# Patient Record
Sex: FEMALE | Race: OTHER | HISPANIC OR LATINO | Employment: PART TIME | URBAN - METROPOLITAN AREA
[De-identification: names, ages, dates, MRNs, and addresses within clinical notes are randomized per-mention and may not be internally consistent; named-entity substitution may affect disease eponyms.]

---

## 2017-10-30 ENCOUNTER — HOSPITAL ENCOUNTER (EMERGENCY)
Facility: HOSPITAL | Age: 22
Discharge: HOME | End: 2017-10-30
Attending: EMERGENCY MEDICINE | Admitting: EMERGENCY MEDICINE
Payer: COMMERCIAL

## 2017-10-30 VITALS
TEMPERATURE: 97.6 F | HEIGHT: 63 IN | BODY MASS INDEX: 19.49 KG/M2 | WEIGHT: 110 LBS | OXYGEN SATURATION: 100 % | HEART RATE: 73 BPM | DIASTOLIC BLOOD PRESSURE: 66 MMHG | SYSTOLIC BLOOD PRESSURE: 112 MMHG | RESPIRATION RATE: 18 BRPM

## 2017-10-30 DIAGNOSIS — S30.861A TICK BITE OF ABDOMEN, INITIAL ENCOUNTER: Primary | ICD-10-CM
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
## 2017-10-30 PROCEDURE — 99282 EMERGENCY DEPT VISIT SF MDM: CPT

## 2017-10-30 RX ORDER — DOXYCYCLINE HYCLATE 100 MG/1
100 CAPSULE ORAL EVERY 12 HOURS SCHEDULED
Qty: 28 CAPSULE | Refills: 0 | Status: SHIPPED | OUTPATIENT
Start: 2017-10-30 | End: 2017-11-13

## 2017-10-30 RX ORDER — DOXYCYCLINE HYCLATE 100 MG/1
100 CAPSULE ORAL ONCE
Status: COMPLETED | OUTPATIENT
Start: 2017-10-30 | End: 2017-10-30

## 2017-10-30 RX ADMIN — DOXYCYCLINE HYCLATE 100 MG: 100 CAPSULE, GELATIN COATED ORAL at 19:07

## 2017-10-30 NOTE — DISCHARGE INSTRUCTIONS
Tick Bite   WHAT YOU NEED TO KNOW:   Most tick bites are not dangerous, but ticks can pass disease or infection when they bite  Ticks need to be removed quickly  You may have redness, pain, itching, and swelling near the bite  Blisters may also develop  DISCHARGE INSTRUCTIONS:   Return to the emergency department if:   · You have trouble walking or moving your legs  · You have joint pain, muscle pain, or muscle weakness within 1 month of a tick bite  · You have a fever, chills, headache, or rash  Contact your healthcare provider if:   · You cannot remove the tick  · The tick's head is stuck in your skin  · You have questions or concerns about your condition or care  Medicines:   · Medicines  help decrease pain, redness, itching, and swelling  You may also need medicine to prevent or fight a bacterial infection  These medicines may be given as a cream, lotion, or pill  · Take your medicine as directed  Contact your healthcare provider if you think your medicine is not helping or if you have side effects  Tell him of her if you are allergic to any medicine  Keep a list of the medicines, vitamins, and herbs you take  Include the amounts, and when and why you take them  Bring the list or the pill bottles to follow-up visits  Carry your medicine list with you in case of an emergency  How to remove a tick:  Remove the tick as soon as possible to help prevent disease or infection  You are less likely to get sick from a tick bite if you remove the tick within 24 hours  Do not use petroleum jelly, nail polish, rubbing alcohol, or heat  These do not work and may be dangerous  Do the following to remove a tick:  · First, try a soapy cotton ball  Soak a cotton ball in liquid soap  Cover the tick with the cotton ball for 30 seconds  The tick may come off with the cotton ball when you pull it away  · Use tweezers if the soapy cotton ball does not work  Grasp the tick as close to your skin as possible  Pull the tick straight up and out  Do not touch the tick with your bare hands  · Do not twist or jerk the tick suddenly, because this may break off the tick's head or mouth parts  Do not leave any part of the tick in your skin  · Do not crush or squeeze the tick since its body may be infected with germs  Flush the tick down the toilet  · After the tick is removed, clean the area of the bite with rubbing alcohol  Then wash your hands with soap and water  Apply ice  on your bite for 15 to 20 minutes every hour or as directed  Use an ice pack, or put crushed ice in a plastic bag  Cover it with a towel before you apply it to your skin  Ice helps prevent tissue damage and decreases swelling and pain  Prevent a tick bite:  Ticks live in areas covered by brush and grass  They may even be found in your lawn if you live in certain areas  Outdoor pets can carry ticks inside the house  Ticks can grab onto you or your clothes when you walk by grass or brush  If you go into areas that contain many trees, tall grasses, and underbrush, do the following:  · Wear light colored pants and a long-sleeved shirt  Tuck your pants into your socks or boots  Tuck in your shirt  Wear sleeves that fit close to the skin at your wrists and neck  This will help prevent ticks from crawling through gaps in your clothing and onto your skin  Wear a hat in areas with trees  · Apply insect repellant on your skin  The insect repellant should contain DEET  Do not put insect repellant on skin that is cut, scratched, or irritated  Always use soap and water to wash the insect repellant off as soon as possible once you are indoors  Do not apply insect repellant on your child's face or hands  · Spray insect repellant onto your clothes  Use permethrin spray  This spray kills ticks that crawl on your clothing  Be sure to spray the tops of your boots, bottom of pant legs, and sleeve cuffs   As soon as possible, wash and dry clothing in hot water and high heat  · Check your clothing, hair, and skin for ticks  Shower within 2 hours of coming indoors  Carefully check the hairline, armpits, neck, and waist  Check your pets and children for ticks  Remove ticks from pets the same way as you remove them from people  · Decrease the risk for ticks in your yard  Ticks like to live in shady, moist areas  Marquise Morales your lawn regularly to keep the grass short  Trim the grass around birdbaths and fences  Cut branches that are overgrown and take them out of the yard  Clear out leaf piles  Caralyn Salter firewood in a dry, jv area  Follow up with your healthcare provider as directed:  Write down your questions so you remember to ask them during your visits  © 2017 [employer] [employer] Information is for End User's use only and may not be sold, redistributed or otherwise used for commercial purposes  All illustrations and images included in CareNotes® are the copyrighted property of [employer]  or Osvaldo Ford  The above information is an  only  It is not intended as medical advice for individual conditions or treatments  Talk to your doctor, nurse or pharmacist before following any medical regimen to see if it is safe and effective for you

## 2017-10-31 NOTE — ED PROVIDER NOTES
History  Chief Complaint   Patient presents with    Tick Bite     Patient reports tick bite on stomach  unsure of when she received it  small amount of reddness     24 y/o female presents today after discovering a tick on her abdomen earlier today  Tick was removed by her friend  Unknown how long the tick was on there  Unknown whether the tick was engorged or not  Mild redness at the site  No other rashes or symptoms         History provided by:  Patient  Tick Bite   Contact animal:  Insect  Time since incident: unknown  Pain details:     Severity:  No pain  Incident location:  Unable to specify  Tetanus status:  Up to date  Relieved by:  None tried  Worsened by:  Nothing  Ineffective treatments:  None tried  Associated symptoms: rash    Associated symptoms: no fever, no numbness and no swelling        None       History reviewed  No pertinent past medical history  History reviewed  No pertinent surgical history  History reviewed  No pertinent family history  I have reviewed and agree with the history as documented  Social History   Substance Use Topics    Smoking status: Never Smoker    Smokeless tobacco: Never Used    Alcohol use No        Review of Systems   Constitutional: Negative for appetite change, chills, fatigue, fever and unexpected weight change  HENT: Negative for drooling, postnasal drip, sore throat and trouble swallowing  Eyes: Negative for visual disturbance  Respiratory: Negative for chest tightness and shortness of breath  Skin: Positive for rash  Allergic/Immunologic: Negative for immunocompromised state  Neurological: Negative for dizziness, light-headedness, numbness and headaches  Psychiatric/Behavioral: Negative for behavioral problems         Physical Exam  ED Triage Vitals [10/30/17 1812]   Temperature Pulse Respirations Blood Pressure SpO2   97 6 °F (36 4 °C) 73 18 112/66 100 %      Temp Source Heart Rate Source Patient Position - Orthostatic VS BP Location FiO2 (%)   Temporal Monitor Sitting Right arm --      Pain Score       No Pain           Orthostatic Vital Signs  Vitals:    10/30/17 1812   BP: 112/66   Pulse: 73   Patient Position - Orthostatic VS: Sitting       Physical Exam   Constitutional: She appears well-developed and well-nourished  HENT:   Head: Normocephalic and atraumatic  Eyes: Pupils are equal, round, and reactive to light  Neck: Normal range of motion  Abdominal: Soft  Bowel sounds are normal        Small (<1cm) area of erythema on left lateral abdomen  No tick present   Skin: Skin is warm and dry  Nursing note and vitals reviewed  ED Medications  Medications   doxycycline hyclate (VIBRAMYCIN) capsule 100 mg (100 mg Oral Given 10/30/17 1907)       Diagnostic Studies  Results Reviewed     None                 No orders to display              Procedures  Procedures       Phone Contacts  ED Phone Contact    ED Course  ED Course                                MDM  Number of Diagnoses or Management Options  Tick bite of abdomen, initial encounter: new and does not require workup  Diagnosis management comments: 9:19 PM  Late entry - due to unknown length of time of tick being present, will opt to treat with doxycycline  F/u with PCP as outpatient  Risk of Complications, Morbidity, and/or Mortality  Presenting problems: low  Management options: low    Patient Progress  Patient progress: stable    CritCare Time    Disposition  Final diagnoses:   Tick bite of abdomen, initial encounter     Time reflects when diagnosis was documented in both MDM as applicable and the Disposition within this note     Time User Action Codes Description Comment    10/30/2017  7:02 PM Shannon Valenzuela Add [[id number],  Kristin Stringer  XXXA] Tick bite of abdomen, initial encounter       ED Disposition     ED Disposition Condition Comment    Discharge  Daniella Erwin discharge to home/self care      Condition at discharge: Stable        Follow-up Information     Follow up With Specialties Details Why Contact Info    your PCP  Schedule an appointment as soon as possible for a visit          Discharge Medication List as of 10/30/2017  7:02 PM      START taking these medications    Details   doxycycline hyclate (VIBRAMYCIN) 100 mg capsule Take 1 capsule by mouth every 12 (twelve) hours for 14 days, Starting Mon 10/30/2017, Until Mon 11/13/2017, Print           No discharge procedures on file      ED Provider  Electronically Signed by           Arun Akbar DO  10/30/17 0310

## 2018-04-27 ENCOUNTER — APPOINTMENT (OUTPATIENT)
Dept: LAB | Facility: HOSPITAL | Age: 23
End: 2018-04-27
Payer: COMMERCIAL

## 2018-04-27 ENCOUNTER — TRANSCRIBE ORDERS (OUTPATIENT)
Dept: ADMINISTRATIVE | Facility: HOSPITAL | Age: 23
End: 2018-04-27

## 2018-04-27 DIAGNOSIS — R06.2 WHEEZING: ICD-10-CM

## 2018-04-27 DIAGNOSIS — Z20.2 VENEREAL DISEASE CONTACT: ICD-10-CM

## 2018-04-27 DIAGNOSIS — N91.2 ABSENCE OF MENSTRUATION: Primary | ICD-10-CM

## 2018-04-27 DIAGNOSIS — N91.2 ABSENCE OF MENSTRUATION: ICD-10-CM

## 2018-04-27 LAB
ALBUMIN SERPL BCP-MCNC: 4.2 G/DL (ref 3.5–5)
ALP SERPL-CCNC: 54 U/L (ref 46–116)
ALT SERPL W P-5'-P-CCNC: 20 U/L (ref 12–78)
ANION GAP SERPL CALCULATED.3IONS-SCNC: 9 MMOL/L (ref 4–13)
AST SERPL W P-5'-P-CCNC: 16 U/L (ref 5–45)
BASOPHILS # BLD AUTO: 0.02 THOUSANDS/ΜL (ref 0–0.1)
BASOPHILS NFR BLD AUTO: 0 % (ref 0–1)
BILIRUB SERPL-MCNC: 0.9 MG/DL (ref 0.2–1)
BUN SERPL-MCNC: 13 MG/DL (ref 5–25)
CALCIUM SERPL-MCNC: 9 MG/DL (ref 8.3–10.1)
CHLORIDE SERPL-SCNC: 104 MMOL/L (ref 100–108)
CHOLEST SERPL-MCNC: 143 MG/DL (ref 50–200)
CO2 SERPL-SCNC: 26 MMOL/L (ref 21–32)
CREAT SERPL-MCNC: 0.97 MG/DL (ref 0.6–1.3)
EOSINOPHIL # BLD AUTO: 0.03 THOUSAND/ΜL (ref 0–0.61)
EOSINOPHIL NFR BLD AUTO: 0 % (ref 0–6)
ERYTHROCYTE [DISTWIDTH] IN BLOOD BY AUTOMATED COUNT: 14.6 % (ref 11.6–15.1)
GFR SERPL CREATININE-BSD FRML MDRD: 83 ML/MIN/1.73SQ M
GLUCOSE P FAST SERPL-MCNC: 78 MG/DL (ref 65–99)
HCT VFR BLD AUTO: 39.1 % (ref 34.8–46.1)
HDLC SERPL-MCNC: 78 MG/DL (ref 40–60)
HGB BLD-MCNC: 12.6 G/DL (ref 11.5–15.4)
LDLC SERPL CALC-MCNC: 56 MG/DL (ref 0–100)
LYMPHOCYTES # BLD AUTO: 1.97 THOUSANDS/ΜL (ref 0.6–4.47)
LYMPHOCYTES NFR BLD AUTO: 26 % (ref 14–44)
MCH RBC QN AUTO: 29.3 PG (ref 26.8–34.3)
MCHC RBC AUTO-ENTMCNC: 32.2 G/DL (ref 31.4–37.4)
MCV RBC AUTO: 91 FL (ref 82–98)
MONOCYTES # BLD AUTO: 0.55 THOUSAND/ΜL (ref 0.17–1.22)
MONOCYTES NFR BLD AUTO: 7 % (ref 4–12)
NEUTROPHILS # BLD AUTO: 5.03 THOUSANDS/ΜL (ref 1.85–7.62)
NEUTS SEG NFR BLD AUTO: 66 % (ref 43–75)
NONHDLC SERPL-MCNC: 65 MG/DL
NRBC BLD AUTO-RTO: 0 /100 WBCS
PLATELET # BLD AUTO: 273 THOUSANDS/UL (ref 149–390)
PMV BLD AUTO: 10.1 FL (ref 8.9–12.7)
POTASSIUM SERPL-SCNC: 3.8 MMOL/L (ref 3.5–5.3)
PROT SERPL-MCNC: 7.5 G/DL (ref 6.4–8.2)
RBC # BLD AUTO: 4.3 MILLION/UL (ref 3.81–5.12)
SODIUM SERPL-SCNC: 139 MMOL/L (ref 136–145)
TRIGL SERPL-MCNC: 45 MG/DL
TSH SERPL DL<=0.05 MIU/L-ACNC: 0.6 UIU/ML (ref 0.36–3.74)
WBC # BLD AUTO: 7.63 THOUSAND/UL (ref 4.31–10.16)

## 2018-04-27 PROCEDURE — 87491 CHLMYD TRACH DNA AMP PROBE: CPT

## 2018-04-27 PROCEDURE — 82785 ASSAY OF IGE: CPT

## 2018-04-27 PROCEDURE — 36415 COLL VENOUS BLD VENIPUNCTURE: CPT

## 2018-04-27 PROCEDURE — 80074 ACUTE HEPATITIS PANEL: CPT

## 2018-04-27 PROCEDURE — 86003 ALLG SPEC IGE CRUDE XTRC EA: CPT

## 2018-04-27 PROCEDURE — 86008 ALLG SPEC IGE RECOMB EA: CPT

## 2018-04-27 PROCEDURE — 86800 THYROGLOBULIN ANTIBODY: CPT

## 2018-04-27 PROCEDURE — 80061 LIPID PANEL: CPT

## 2018-04-27 PROCEDURE — 86592 SYPHILIS TEST NON-TREP QUAL: CPT

## 2018-04-27 PROCEDURE — 80053 COMPREHEN METABOLIC PANEL: CPT

## 2018-04-27 PROCEDURE — 85025 COMPLETE CBC W/AUTO DIFF WBC: CPT

## 2018-04-27 PROCEDURE — 87389 HIV-1 AG W/HIV-1&-2 AB AG IA: CPT

## 2018-04-27 PROCEDURE — 87591 N.GONORRHOEAE DNA AMP PROB: CPT

## 2018-04-27 PROCEDURE — 86696 HERPES SIMPLEX TYPE 2 TEST: CPT

## 2018-04-27 PROCEDURE — 84443 ASSAY THYROID STIM HORMONE: CPT

## 2018-04-27 PROCEDURE — 86695 HERPES SIMPLEX TYPE 1 TEST: CPT

## 2018-04-28 LAB
HAV IGM SER QL: NORMAL
HBV CORE IGM SER QL: NORMAL
HBV SURFACE AG SER QL: NORMAL
HCV AB SER QL: NORMAL
THYROGLOB AB SERPL-ACNC: <1 IU/ML (ref 0–0.9)

## 2018-04-30 ENCOUNTER — HOSPITAL ENCOUNTER (EMERGENCY)
Facility: HOSPITAL | Age: 23
Discharge: HOME | End: 2018-04-30
Attending: EMERGENCY MEDICINE | Admitting: EMERGENCY MEDICINE
Payer: COMMERCIAL

## 2018-04-30 VITALS
TEMPERATURE: 98.2 F | OXYGEN SATURATION: 100 % | DIASTOLIC BLOOD PRESSURE: 62 MMHG | WEIGHT: 106 LBS | SYSTOLIC BLOOD PRESSURE: 108 MMHG | BODY MASS INDEX: 18.78 KG/M2 | HEIGHT: 63 IN | HEART RATE: 77 BPM | RESPIRATION RATE: 18 BRPM

## 2018-04-30 DIAGNOSIS — N73.0 PID (ACUTE PELVIC INFLAMMATORY DISEASE): Primary | ICD-10-CM

## 2018-04-30 DIAGNOSIS — T74.21XA SEXUAL ASSAULT OF ADULT: ICD-10-CM

## 2018-04-30 LAB
A ALTERNATA IGE QN: <0.1 KUA/I
A FUMIGATUS IGE QN: <0.1 KUA/I
A-LACTALB IGE QN: <0.1 KAU/I
ALBUMIN SERPL BCP-MCNC: 3.6 G/DL (ref 3.5–5)
ALLERGEN COMMENT: ABNORMAL
ALLERGEN COMMENT: ABNORMAL
ALMOND IGE QN: <0.1 KUA/I
ALP SERPL-CCNC: 49 U/L (ref 46–116)
ALT SERPL W P-5'-P-CCNC: 16 U/L (ref 12–78)
AMPHETAMINES SERPL QL SCN: NEGATIVE
ANION GAP SERPL CALCULATED.3IONS-SCNC: 6 MMOL/L (ref 4–13)
AST SERPL W P-5'-P-CCNC: 11 U/L (ref 5–45)
B-LACTOGLOB IGE QN: <0.1 KAU/I
BARBITURATES UR QL: NEGATIVE
BASOPHILS # BLD AUTO: 0.02 THOUSANDS/ΜL (ref 0–0.1)
BASOPHILS NFR BLD AUTO: 0 % (ref 0–1)
BENZODIAZ UR QL: NEGATIVE
BERMUDA GRASS IGE QN: <0.1 KUA/I
BILIRUB SERPL-MCNC: 0.3 MG/DL (ref 0.2–1)
BILIRUB UR QL STRIP: NEGATIVE
BOXELDER IGE QN: <0.1 KUA/I
BUN SERPL-MCNC: 8 MG/DL (ref 5–25)
C HERBARUM IGE QN: <0.1 KUA/I
CALCIUM SERPL-MCNC: 8.4 MG/DL (ref 8.3–10.1)
CASEIN IGE QN: <0.1 KAU/I
CASHEW NUT IGE QN: <0.1 KUA/I
CAT DANDER IGE QN: 2.63 KUA/I
CHLAMYDIA DNA CVX QL NAA+PROBE: NORMAL
CHLORIDE SERPL-SCNC: 105 MMOL/L (ref 100–108)
CLARITY UR: NORMAL
CMN PIGWEED IGE QN: <0.1 KUA/I
CO2 SERPL-SCNC: 30 MMOL/L (ref 21–32)
COCAINE UR QL: NEGATIVE
CODFISH IGE QN: <0.1 KUA/I
COLOR UR: YELLOW
COMMON RAGWEED IGE QN: <0.1 KUA/I
COTTONWOOD IGE QN: <0.1 KUA/I
CREAT SERPL-MCNC: 0.98 MG/DL (ref 0.6–1.3)
D FARINAE IGE QN: 1.34 KUA/I
D PTERONYSS IGE QN: 1.53 KUA/I
DOG DANDER IGE QN: 2.07 KUA/I
EGG WHITE IGE QN: <0.1 KUA/I
EOSINOPHIL # BLD AUTO: 0.11 THOUSAND/ΜL (ref 0–0.61)
EOSINOPHIL NFR BLD AUTO: 1 % (ref 0–6)
ERYTHROCYTE [DISTWIDTH] IN BLOOD BY AUTOMATED COUNT: 14.5 % (ref 11.6–15.1)
EXT PREG TEST URINE: NEGATIVE
GFR SERPL CREATININE-BSD FRML MDRD: 82 ML/MIN/1.73SQ M
GLUCOSE SERPL-MCNC: 87 MG/DL (ref 65–140)
GLUCOSE UR STRIP-MCNC: NEGATIVE MG/DL
GLUTEN IGE QN: <0.1 KUA/I
HAZELNUT IGE QN: <0.1 KUA/L
HCT VFR BLD AUTO: 36.5 % (ref 34.8–46.1)
HGB BLD-MCNC: 11.7 G/DL (ref 11.5–15.4)
HGB UR QL STRIP.AUTO: NEGATIVE
HIV 1+2 AB+HIV1 P24 AG SERPL QL IA: NORMAL
KETONES UR STRIP-MCNC: NEGATIVE MG/DL
LEUKOCYTE ESTERASE UR QL STRIP: NEGATIVE
LIPASE SERPL-CCNC: 194 U/L (ref 73–393)
LONDON PLANE IGE QN: <0.1 KUA/I
LYMPHOCYTES # BLD AUTO: 3.02 THOUSANDS/ΜL (ref 0.6–4.47)
LYMPHOCYTES NFR BLD AUTO: 33 % (ref 14–44)
MCH RBC QN AUTO: 29.1 PG (ref 26.8–34.3)
MCHC RBC AUTO-ENTMCNC: 32.1 G/DL (ref 31.4–37.4)
MCV RBC AUTO: 91 FL (ref 82–98)
METHADONE UR QL: NEGATIVE
MILK IGE QN: 0.11 KUA/I
MONOCYTES # BLD AUTO: 0.69 THOUSAND/ΜL (ref 0.17–1.22)
MONOCYTES NFR BLD AUTO: 8 % (ref 4–12)
MOUSE URINE PROT IGE QN: <0.1 KUA/I
MT JUNIPER IGE QN: 0.21 KUA/I
MUGWORT IGE QN: <0.1 KUA/I
N GONORRHOEA DNA GENITAL QL NAA+PROBE: NORMAL
NEUTROPHILS # BLD AUTO: 5.23 THOUSANDS/ΜL (ref 1.85–7.62)
NEUTS SEG NFR BLD AUTO: 58 % (ref 43–75)
NITRITE UR QL STRIP: NEGATIVE
NRBC BLD AUTO-RTO: 0 /100 WBCS
OPIATES UR QL SCN: NEGATIVE
P NOTATUM IGE QN: <0.1 KUA/I
PCP UR QL: NEGATIVE
PEANUT IGE QN: <0.1 KUA/I
PH UR STRIP.AUTO: 8 [PH] (ref 4.5–8)
PLATELET # BLD AUTO: 252 THOUSANDS/UL (ref 149–390)
PMV BLD AUTO: 9.6 FL (ref 8.9–12.7)
POTASSIUM SERPL-SCNC: 3.8 MMOL/L (ref 3.5–5.3)
PROT SERPL-MCNC: 6.4 G/DL (ref 6.4–8.2)
PROT UR STRIP-MCNC: NEGATIVE MG/DL
RBC # BLD AUTO: 4.02 MILLION/UL (ref 3.81–5.12)
ROACH IGE QN: 11.2 KUA/I
RPR SER QL: NORMAL
SALMON IGE QN: <0.1 KUA/I
SCALLOP IGE QN: <0.1 KUA/L
SESAME SEED IGE QN: <0.1 KUA/I
SHEEP SORREL IGE QN: <0.1 KUA/I
SHRIMP IGE QN: 1.3 KUA/L
SILVER BIRCH IGE QN: <0.1 KUA/I
SODIUM SERPL-SCNC: 141 MMOL/L (ref 136–145)
SOYBEAN IGE QN: <0.1 KUA/I
SP GR UR STRIP.AUTO: 1.02 (ref 1–1.03)
THC UR QL: POSITIVE
TIMOTHY IGE QN: <0.1 KUA/I
TOTAL IGE SMQN RAST: 497 KU/L (ref 0–113)
TOTAL IGE SMQN RAST: 514 KU/L (ref 0–113)
TUNA IGE QN: <0.1 KUA/I
UROBILINOGEN UR QL STRIP.AUTO: 0.2 E.U./DL
WALNUT IGE QN: <0.1 KUA/I
WALNUT IGE QN: <0.1 KUA/I
WBC # BLD AUTO: 9.1 THOUSAND/UL (ref 4.31–10.16)
WHEAT IGE QN: <0.1 KUA/I
WHITE ASH IGE QN: <0.1 KUA/I
WHITE ELM IGE QN: <0.1 KUA/I
WHITE MULBERRY IGE QN: <0.1 KUA/I
WHITE OAK IGE QN: <0.1 KUA/I

## 2018-04-30 PROCEDURE — 80053 COMPREHEN METABOLIC PANEL: CPT | Performed by: EMERGENCY MEDICINE

## 2018-04-30 PROCEDURE — 83690 ASSAY OF LIPASE: CPT | Performed by: EMERGENCY MEDICINE

## 2018-04-30 PROCEDURE — 96374 THER/PROPH/DIAG INJ IV PUSH: CPT

## 2018-04-30 PROCEDURE — 80307 DRUG TEST PRSMV CHEM ANLYZR: CPT | Performed by: EMERGENCY MEDICINE

## 2018-04-30 PROCEDURE — 36415 COLL VENOUS BLD VENIPUNCTURE: CPT | Performed by: EMERGENCY MEDICINE

## 2018-04-30 PROCEDURE — 87591 N.GONORRHOEAE DNA AMP PROB: CPT | Performed by: EMERGENCY MEDICINE

## 2018-04-30 PROCEDURE — 81025 URINE PREGNANCY TEST: CPT | Performed by: EMERGENCY MEDICINE

## 2018-04-30 PROCEDURE — 96375 TX/PRO/DX INJ NEW DRUG ADDON: CPT

## 2018-04-30 PROCEDURE — 99285 EMERGENCY DEPT VISIT HI MDM: CPT

## 2018-04-30 PROCEDURE — 85025 COMPLETE CBC W/AUTO DIFF WBC: CPT | Performed by: EMERGENCY MEDICINE

## 2018-04-30 PROCEDURE — 96372 THER/PROPH/DIAG INJ SC/IM: CPT

## 2018-04-30 PROCEDURE — 87491 CHLMYD TRACH DNA AMP PROBE: CPT | Performed by: EMERGENCY MEDICINE

## 2018-04-30 PROCEDURE — 96361 HYDRATE IV INFUSION ADD-ON: CPT

## 2018-04-30 PROCEDURE — 81003 URINALYSIS AUTO W/O SCOPE: CPT | Performed by: EMERGENCY MEDICINE

## 2018-04-30 RX ORDER — METRONIDAZOLE 500 MG/1
500 TABLET ORAL EVERY 12 HOURS SCHEDULED
Qty: 28 TABLET | Refills: 0 | Status: SHIPPED | OUTPATIENT
Start: 2018-04-30 | End: 2018-05-14 | Stop reason: HOSPADM

## 2018-04-30 RX ORDER — METRONIDAZOLE 500 MG/1
500 TABLET ORAL ONCE
Status: COMPLETED | OUTPATIENT
Start: 2018-04-30 | End: 2018-04-30

## 2018-04-30 RX ORDER — KETOROLAC TROMETHAMINE 30 MG/ML
15 INJECTION, SOLUTION INTRAMUSCULAR; INTRAVENOUS ONCE
Status: COMPLETED | OUTPATIENT
Start: 2018-04-30 | End: 2018-04-30

## 2018-04-30 RX ORDER — ONDANSETRON 2 MG/ML
4 INJECTION INTRAMUSCULAR; INTRAVENOUS ONCE
Status: COMPLETED | OUTPATIENT
Start: 2018-04-30 | End: 2018-04-30

## 2018-04-30 RX ORDER — FLUCONAZOLE 200 MG/1
200 TABLET ORAL DAILY
Qty: 2 TABLET | Refills: 0 | Status: SHIPPED | OUTPATIENT
Start: 2018-04-30 | End: 2018-05-02

## 2018-04-30 RX ORDER — AZITHROMYCIN 250 MG/1
1000 TABLET, FILM COATED ORAL ONCE
Status: COMPLETED | OUTPATIENT
Start: 2018-04-30 | End: 2018-04-30

## 2018-04-30 RX ADMIN — KETOROLAC TROMETHAMINE 15 MG: 30 INJECTION, SOLUTION INTRAMUSCULAR at 18:50

## 2018-04-30 RX ADMIN — METRONIDAZOLE 500 MG: 500 TABLET ORAL at 21:23

## 2018-04-30 RX ADMIN — ONDANSETRON 4 MG: 2 INJECTION INTRAMUSCULAR; INTRAVENOUS at 21:26

## 2018-04-30 RX ADMIN — SODIUM CHLORIDE 1000 ML: 0.9 INJECTION, SOLUTION INTRAVENOUS at 18:47

## 2018-04-30 RX ADMIN — LIDOCAINE HYDROCHLORIDE 250 MG: 10 INJECTION, SOLUTION EPIDURAL; INFILTRATION; INTRACAUDAL; PERINEURAL at 21:27

## 2018-04-30 RX ADMIN — AZITHROMYCIN 1000 MG: 250 TABLET, FILM COATED ORAL at 21:24
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
## 2018-05-02 ENCOUNTER — TELEPHONE (OUTPATIENT)
Dept: OBGYN CLINIC | Facility: CLINIC | Age: 23
End: 2018-05-02

## 2018-05-02 LAB
CHLAMYDIA DNA CVX QL NAA+PROBE: NORMAL
N GONORRHOEA DNA GENITAL QL NAA+PROBE: NORMAL

## 2018-05-02 NOTE — PROGRESS NOTES
Please call alfredo - would she like an office visit for follow up to ER? Treated for pelvic infection    Or at least an annual exam for gyn care

## 2018-05-02 NOTE — TELEPHONE ENCOUNTER
----- Message from Maris Fernando MD sent at 5/2/2018 12:27 PM EDT -----      ----- Message -----  From: Prem Huang DO  Sent: 5/1/2018  12:01 AM  To: Maris Fernando MD

## 2018-05-03 LAB — MISCELLANEOUS LAB TEST RESULT: NORMAL

## 2018-05-12 ENCOUNTER — HOSPITAL ENCOUNTER (INPATIENT)
Facility: HOSPITAL | Age: 23
LOS: 2 days | DRG: 812 | End: 2018-05-14
Attending: EMERGENCY MEDICINE | Admitting: ANESTHESIOLOGY
Payer: COMMERCIAL

## 2018-05-12 DIAGNOSIS — T39.1X2A INTENTIONAL ACETAMINOPHEN OVERDOSE (HCC): ICD-10-CM

## 2018-05-12 DIAGNOSIS — T14.91XA SUICIDAL BEHAVIOR WITH ATTEMPTED SELF-INJURY (HCC): ICD-10-CM

## 2018-05-12 DIAGNOSIS — T39.1X4A ACETAMINOPHEN OVERDOSE OF UNDETERMINED INTENT, INITIAL ENCOUNTER: Primary | ICD-10-CM

## 2018-05-12 PROBLEM — G93.41 METABOLIC ENCEPHALOPATHY: Status: ACTIVE | Noted: 2018-05-12

## 2018-05-12 PROBLEM — B37.3 VAGINAL CANDIDIASIS: Status: ACTIVE | Noted: 2018-05-12

## 2018-05-12 PROBLEM — R06.89 RESPIRATORY DEPRESSION: Status: ACTIVE | Noted: 2018-05-12

## 2018-05-12 PROBLEM — I95.9 HYPOTENSION: Status: ACTIVE | Noted: 2018-05-12

## 2018-05-12 PROBLEM — T39.1X1A TYLENOL TOXICITY: Status: ACTIVE | Noted: 2018-05-12

## 2018-05-12 LAB
ALBUMIN SERPL BCP-MCNC: 3.4 G/DL (ref 3.5–5)
ALBUMIN SERPL BCP-MCNC: 4.1 G/DL (ref 3.5–5)
ALP SERPL-CCNC: 37 U/L (ref 46–116)
ALP SERPL-CCNC: 47 U/L (ref 46–116)
ALT SERPL W P-5'-P-CCNC: 37 U/L (ref 12–78)
ALT SERPL W P-5'-P-CCNC: 42 U/L (ref 12–78)
AMPHETAMINES SERPL QL SCN: NEGATIVE
ANION GAP SERPL CALCULATED.3IONS-SCNC: 10 MMOL/L (ref 4–13)
APAP SERPL-MCNC: 173.1 UG/ML (ref 10–30)
APAP SERPL-MCNC: 270.5 UG/ML (ref 10–30)
APTT PPP: 29 SECONDS (ref 23–35)
AST SERPL W P-5'-P-CCNC: 28 U/L (ref 5–45)
AST SERPL W P-5'-P-CCNC: 35 U/L (ref 5–45)
BARBITURATES UR QL: NEGATIVE
BASOPHILS # BLD AUTO: 0.03 THOUSANDS/ΜL (ref 0–0.1)
BASOPHILS NFR BLD AUTO: 1 % (ref 0–1)
BENZODIAZ UR QL: NEGATIVE
BILIRUB DIRECT SERPL-MCNC: 0.08 MG/DL (ref 0–0.2)
BILIRUB SERPL-MCNC: 0.4 MG/DL (ref 0.2–1)
BILIRUB SERPL-MCNC: 0.4 MG/DL (ref 0.2–1)
BUN SERPL-MCNC: 11 MG/DL (ref 5–25)
CALCIUM SERPL-MCNC: 9 MG/DL (ref 8.3–10.1)
CHLORIDE SERPL-SCNC: 104 MMOL/L (ref 100–108)
CO2 SERPL-SCNC: 25 MMOL/L (ref 21–32)
COCAINE UR QL: NEGATIVE
CREAT SERPL-MCNC: 0.91 MG/DL (ref 0.6–1.3)
EOSINOPHIL # BLD AUTO: 0.02 THOUSAND/ΜL (ref 0–0.61)
EOSINOPHIL NFR BLD AUTO: 0 % (ref 0–6)
ERYTHROCYTE [DISTWIDTH] IN BLOOD BY AUTOMATED COUNT: 14.3 % (ref 11.6–15.1)
ETHANOL SERPL-MCNC: <3 MG/DL (ref 0–3)
GFR SERPL CREATININE-BSD FRML MDRD: 89 ML/MIN/1.73SQ M
GLUCOSE SERPL-MCNC: 118 MG/DL (ref 65–140)
GLUCOSE SERPL-MCNC: 183 MG/DL (ref 65–140)
HCG SERPL QL: NEGATIVE
HCT VFR BLD AUTO: 38.1 % (ref 34.8–46.1)
HGB BLD-MCNC: 12.3 G/DL (ref 11.5–15.4)
INR PPP: 1.06 (ref 0.86–1.16)
LYMPHOCYTES # BLD AUTO: 2.04 THOUSANDS/ΜL (ref 0.6–4.47)
LYMPHOCYTES NFR BLD AUTO: 33 % (ref 14–44)
MAGNESIUM SERPL-MCNC: 1.7 MG/DL (ref 1.6–2.6)
MCH RBC QN AUTO: 29 PG (ref 26.8–34.3)
MCHC RBC AUTO-ENTMCNC: 32.3 G/DL (ref 31.4–37.4)
MCV RBC AUTO: 90 FL (ref 82–98)
METHADONE UR QL: NEGATIVE
MONOCYTES # BLD AUTO: 0.57 THOUSAND/ΜL (ref 0.17–1.22)
MONOCYTES NFR BLD AUTO: 9 % (ref 4–12)
NEUTROPHILS # BLD AUTO: 3.46 THOUSANDS/ΜL (ref 1.85–7.62)
NEUTS SEG NFR BLD AUTO: 56 % (ref 43–75)
NRBC BLD AUTO-RTO: 0 /100 WBCS
OPIATES UR QL SCN: NEGATIVE
PCP UR QL: NEGATIVE
PLATELET # BLD AUTO: 236 THOUSANDS/UL (ref 149–390)
PMV BLD AUTO: 9.9 FL (ref 8.9–12.7)
POTASSIUM SERPL-SCNC: 3.6 MMOL/L (ref 3.5–5.3)
PROT SERPL-MCNC: 6.2 G/DL (ref 6.4–8.2)
PROT SERPL-MCNC: 6.8 G/DL (ref 6.4–8.2)
PROTHROMBIN TIME: 14 SECONDS (ref 12.1–14.4)
RBC # BLD AUTO: 4.24 MILLION/UL (ref 3.81–5.12)
SALICYLATES SERPL-MCNC: <3 MG/DL (ref 3–20)
SODIUM SERPL-SCNC: 139 MMOL/L (ref 136–145)
THC UR QL: NEGATIVE
WBC # BLD AUTO: 6.14 THOUSAND/UL (ref 4.31–10.16)

## 2018-05-12 PROCEDURE — 36415 COLL VENOUS BLD VENIPUNCTURE: CPT | Performed by: EMERGENCY MEDICINE

## 2018-05-12 PROCEDURE — 80307 DRUG TEST PRSMV CHEM ANLYZR: CPT | Performed by: INTERNAL MEDICINE

## 2018-05-12 PROCEDURE — 85730 THROMBOPLASTIN TIME PARTIAL: CPT | Performed by: EMERGENCY MEDICINE

## 2018-05-12 PROCEDURE — 80320 DRUG SCREEN QUANTALCOHOLS: CPT | Performed by: EMERGENCY MEDICINE

## 2018-05-12 PROCEDURE — 99285 EMERGENCY DEPT VISIT HI MDM: CPT

## 2018-05-12 PROCEDURE — 82948 REAGENT STRIP/BLOOD GLUCOSE: CPT

## 2018-05-12 PROCEDURE — 81001 URINALYSIS AUTO W/SCOPE: CPT | Performed by: NURSE PRACTITIONER

## 2018-05-12 PROCEDURE — 80329 ANALGESICS NON-OPIOID 1 OR 2: CPT | Performed by: EMERGENCY MEDICINE

## 2018-05-12 PROCEDURE — 80307 DRUG TEST PRSMV CHEM ANLYZR: CPT | Performed by: EMERGENCY MEDICINE

## 2018-05-12 PROCEDURE — 80329 ANALGESICS NON-OPIOID 1 OR 2: CPT | Performed by: NURSE PRACTITIONER

## 2018-05-12 PROCEDURE — 80053 COMPREHEN METABOLIC PANEL: CPT | Performed by: EMERGENCY MEDICINE

## 2018-05-12 PROCEDURE — 80361 OPIATES 1 OR MORE: CPT | Performed by: NURSE PRACTITIONER

## 2018-05-12 PROCEDURE — 80076 HEPATIC FUNCTION PANEL: CPT | Performed by: NURSE PRACTITIONER

## 2018-05-12 PROCEDURE — 84703 CHORIONIC GONADOTROPIN ASSAY: CPT | Performed by: EMERGENCY MEDICINE

## 2018-05-12 PROCEDURE — 85610 PROTHROMBIN TIME: CPT | Performed by: EMERGENCY MEDICINE

## 2018-05-12 PROCEDURE — 96374 THER/PROPH/DIAG INJ IV PUSH: CPT

## 2018-05-12 PROCEDURE — 93005 ELECTROCARDIOGRAM TRACING: CPT

## 2018-05-12 PROCEDURE — 83735 ASSAY OF MAGNESIUM: CPT | Performed by: EMERGENCY MEDICINE

## 2018-05-12 PROCEDURE — 85025 COMPLETE CBC W/AUTO DIFF WBC: CPT | Performed by: EMERGENCY MEDICINE

## 2018-05-12 RX ORDER — SODIUM CHLORIDE 9 MG/ML
100 INJECTION, SOLUTION INTRAVENOUS CONTINUOUS
Status: DISCONTINUED | OUTPATIENT
Start: 2018-05-12 | End: 2018-05-13

## 2018-05-12 RX ORDER — HEPARIN SODIUM 5000 [USP'U]/ML
5000 INJECTION, SOLUTION INTRAVENOUS; SUBCUTANEOUS EVERY 8 HOURS SCHEDULED
Status: DISCONTINUED | OUTPATIENT
Start: 2018-05-12 | End: 2018-05-14 | Stop reason: HOSPADM

## 2018-05-12 RX ORDER — ONDANSETRON 2 MG/ML
4 INJECTION INTRAMUSCULAR; INTRAVENOUS ONCE
Status: COMPLETED | OUTPATIENT
Start: 2018-05-12 | End: 2018-05-12

## 2018-05-12 RX ORDER — NALOXONE HYDROCHLORIDE 0.4 MG/ML
0.4 INJECTION, SOLUTION INTRAMUSCULAR; INTRAVENOUS; SUBCUTANEOUS ONCE
Status: DISCONTINUED | OUTPATIENT
Start: 2018-05-12 | End: 2018-05-12

## 2018-05-12 RX ORDER — NALOXONE HYDROCHLORIDE 1 MG/ML
INJECTION INTRAMUSCULAR; INTRAVENOUS; SUBCUTANEOUS
Status: COMPLETED
Start: 2018-05-12 | End: 2018-05-12

## 2018-05-12 RX ORDER — FAMOTIDINE 20 MG/1
20 TABLET, FILM COATED ORAL 2 TIMES DAILY
Status: DISCONTINUED | OUTPATIENT
Start: 2018-05-12 | End: 2018-05-14 | Stop reason: HOSPADM

## 2018-05-12 RX ORDER — CHLORHEXIDINE GLUCONATE 0.12 MG/ML
15 RINSE ORAL EVERY 12 HOURS SCHEDULED
Status: DISCONTINUED | OUTPATIENT
Start: 2018-05-12 | End: 2018-05-13

## 2018-05-12 RX ORDER — CLOTRIMAZOLE 1 %
1 CREAM WITH APPLICATOR VAGINAL
Status: DISCONTINUED | OUTPATIENT
Start: 2018-05-12 | End: 2018-05-14 | Stop reason: HOSPADM

## 2018-05-12 RX ORDER — NALOXONE HYDROCHLORIDE 0.4 MG/ML
1 INJECTION, SOLUTION INTRAMUSCULAR; INTRAVENOUS; SUBCUTANEOUS ONCE
Status: COMPLETED | OUTPATIENT
Start: 2018-05-12 | End: 2018-05-12

## 2018-05-12 RX ORDER — NALOXONE HYDROCHLORIDE 1 MG/ML
1 INJECTION INTRAMUSCULAR; INTRAVENOUS; SUBCUTANEOUS
Status: DISCONTINUED | OUTPATIENT
Start: 2018-05-12 | End: 2018-05-14 | Stop reason: HOSPADM

## 2018-05-12 RX ADMIN — SODIUM CHLORIDE 1000 ML: 0.9 INJECTION, SOLUTION INTRAVENOUS at 20:17

## 2018-05-12 RX ADMIN — CHLORHEXIDINE GLUCONATE 15 ML: 1.2 RINSE ORAL at 22:08

## 2018-05-12 RX ADMIN — ACETYLCYSTEINE 7220 MG: 6 INJECTION, SOLUTION INTRAVENOUS at 19:16

## 2018-05-12 RX ADMIN — ONDANSETRON 4 MG: 2 INJECTION INTRAMUSCULAR; INTRAVENOUS at 19:49

## 2018-05-12 RX ADMIN — ACETYLCYSTEINE 2400 MG: 6 INJECTION, SOLUTION INTRAVENOUS at 21:05

## 2018-05-12 RX ADMIN — FAMOTIDINE 20 MG: 10 INJECTION, SOLUTION INTRAVENOUS at 22:08

## 2018-05-12 RX ADMIN — SODIUM CHLORIDE 1000 ML: 0.9 INJECTION, SOLUTION INTRAVENOUS at 23:43

## 2018-05-12 RX ADMIN — NALOXONE HYDROCHLORIDE 2 MG: 1 INJECTION PARENTERAL at 23:17

## 2018-05-12 RX ADMIN — HEPARIN SODIUM 5000 UNITS: 5000 INJECTION, SOLUTION INTRAVENOUS; SUBCUTANEOUS at 23:16

## 2018-05-12 RX ADMIN — NALOXONE HYDROCHLORIDE 1 MG: 0.4 INJECTION, SOLUTION INTRAMUSCULAR; INTRAVENOUS; SUBCUTANEOUS at 23:18

## 2018-05-12 RX ADMIN — CLOTRIMAZOLE 1 APPLICATOR: 10 CREAM VAGINAL at 23:19

## 2018-05-12 RX ADMIN — ONDANSETRON 4 MG: 2 INJECTION INTRAMUSCULAR; INTRAVENOUS at 21:05

## 2018-05-12 RX ADMIN — SODIUM CHLORIDE 100 ML/HR: 0.9 INJECTION, SOLUTION INTRAVENOUS at 22:10

## 2018-05-12 RX ADMIN — SODIUM CHLORIDE, PRESERVATIVE FREE 0.04 MG: 5 INJECTION INTRAVENOUS at 19:49

## 2018-05-12 NOTE — ED NOTES
Pt refused multiple times to provide urine sample stating "i'm tired"  Pt finally agreed to urine sample when I said I would have to straight cath her  Pt was assisted to bathroom by her friend  When I came out of another patients room, her friend was in the bathroom with her  They both came out together and the patient handed me her urine sample       Rocky Santos, CHIDI  05/12/18 [id number]

## 2018-05-12 NOTE — PROGRESS NOTES
I came to evaluate this patient  Patient does not know what else she took  She mentioned that she took lots of Tylenol  Patient is hypotensive and somnolent  I could hardly wake her up or keep her awake  She also then started vomiting  Her Tylenol level is also toxic  She has been started on see to date  There is also some history of psych issues and suicidal attempt  I have discussed about this case with critical care team   This patient is not appropriate for floor and should be monitored more closely at least in a step-down unit  I did ordered after discussion with critical care team Narcan  Also ordered a bolus of IV fluids for low blood pressure  Repeat urine tox screening as patient's friend went with the patient in the bathroom and not sure if this was really accurate  For nausea and vomiting give her a dose of Zofran  Also discussed with staff-RN Abdelrahman Unger taking care of the patient at the time of my visit that patient should not have any visitors as this was requested by critical care team as well

## 2018-05-12 NOTE — ED PROVIDER NOTES
History  Chief Complaint   Patient presents with    Overdose - Intentional     Pt reportedly took (19) 500mg tylenol approximately 5 hours PTA  26-year-old presents for evaluation after reportedly overdosing on Tylenol  Patient says that around noon today she took 19 500 milligram extra-strength tablets of Tylenol  Patient says that she did this because she that she fell down and injured her right wrist   She is accompanied with her friend who was concerned and brought her in  Patient has no other complaints  Denies having nausea, vomiting, chest pain or shortness of breath  She says that she was not trying to harm herself  Denies depression or suicidal ideation  Prior to Admission Medications   Prescriptions Last Dose Informant Patient Reported? Taking?   metroNIDAZOLE (FLAGYL) 500 mg tablet   No No   Sig: Take 1 tablet (500 mg total) by mouth every 12 (twelve) hours for 14 days      Facility-Administered Medications: None       History reviewed  No pertinent past medical history  History reviewed  No pertinent surgical history  History reviewed  No pertinent family history  I have reviewed and agree with the history as documented  Social History   Substance Use Topics    Smoking status: Never Smoker    Smokeless tobacco: Never Used    Alcohol use No      Comment: occasional        Review of Systems   Constitutional: Negative for chills and fever  HENT: Negative for congestion and sore throat  Eyes: Negative for pain and redness  Respiratory: Negative for shortness of breath and wheezing  Cardiovascular: Negative for chest pain and palpitations  Gastrointestinal: Negative for abdominal pain, diarrhea and vomiting  Endocrine: Negative for polydipsia and polyphagia  Genitourinary: Negative for dysuria and flank pain  Musculoskeletal: Negative for arthralgias and back pain  Skin: Negative for rash and wound  Neurological: Negative for seizures and headaches  Psychiatric/Behavioral: Negative for agitation and behavioral problems  All other systems reviewed and are negative  Physical Exam  ED Triage Vitals   Temperature Pulse Respirations Blood Pressure SpO2   05/12/18 1734 05/12/18 1732 05/12/18 1732 05/12/18 1732 05/12/18 1732   97 6 °F (36 4 °C) 75 16 116/73 100 %      Temp Source Heart Rate Source Patient Position - Orthostatic VS BP Location FiO2 (%)   05/12/18 1734 05/12/18 1732 05/12/18 1732 05/12/18 1732 --   Oral Monitor Lying Right arm       Pain Score       05/12/18 1732       No Pain           Orthostatic Vital Signs  Vitals:    05/12/18 1732 05/12/18 1745 05/12/18 1911   BP: 116/73 110/63 (!) 88/52   Pulse: 75 71 64   Patient Position - Orthostatic VS: Lying  Lying       Physical Exam   Constitutional: She is oriented to person, place, and time  She appears well-developed and well-nourished  HENT:   Head: Normocephalic and atraumatic  Right Ear: External ear normal    Left Ear: External ear normal    Mouth/Throat: Oropharynx is clear and moist    Eyes: EOM are normal  Pupils are equal, round, and reactive to light  Neck: Normal range of motion  Cardiovascular: Normal rate, regular rhythm and normal heart sounds  Exam reveals no friction rub  No murmur heard  Pulmonary/Chest: Effort normal  No respiratory distress  She has no wheezes  Abdominal: Soft  Bowel sounds are normal  She exhibits no distension  There is no tenderness  There is no rebound and no guarding  Musculoskeletal: Normal range of motion  She exhibits no edema  Neurological: She is alert and oriented to person, place, and time  No cranial nerve deficit  Coordination normal    Skin: Skin is warm  No erythema  Psychiatric:   Flat affect, quiet   Nursing note and vitals reviewed        ED Medications  Medications   acetylcysteine (ACETADOTE) 7,220 mg in dextrose 5 % 200 mL IVPB (7,220 mg Intravenous New Bag 5/12/18 1916)   acetylcysteine (ACETADOTE) 2,400 mg in dextrose 5 % 500 mL IVPB (not administered)   acetylcysteine (ACETADOTE) 4,820 mg in dextrose 5 % 1,000 mL IVPB (not administered)       Diagnostic Studies  Results Reviewed     Procedure Component Value Units Date/Time    Acetaminophen level [[id number]]  (Abnormal) Collected:  05/12/18 1755    Lab Status:  Final result Specimen:  Blood from Arm, Right Updated:  05/12/18 1840     Acetaminophen Level 270 5 (HH) ug/mL     hCG, qualitative pregnancy [[id number]]  (Normal) Collected:  05/12/18 1755    Lab Status:  Final result Specimen:  Blood from Arm, Right Updated:  05/12/18 1837     Preg, Serum Negative    Salicylate level [[id number]]  (Abnormal) Collected:  05/12/18 1755    Lab Status:  Final result Specimen:  Blood from Arm, Right Updated:  51/27/98 0540     Salicylate Lvl <3 (L) mg/dL     Rapid drug screen, urine [[id number]]  (Normal) Collected:  05/12/18 1814    Lab Status:  Final result Specimen:  Urine from Urine, Clean Catch Updated:  05/12/18 1833     Amph/Meth UR Negative     Barbiturate Ur Negative     Benzodiazepine Urine Negative     Cocaine Urine Negative     Methadone Urine Negative     Opiate Urine Negative     PCP Ur Negative     THC Urine Negative    Narrative:         FOR MEDICAL PURPOSES ONLY  IF CONFIRMATION NEEDED PLEASE CONTACT THE LAB WITHIN 5 DAYS      Drug Screen Cutoff Levels:  AMPHETAMINE/METHAMPHETAMINES  1000 ng/mL  BARBITURATES     200 ng/mL  BENZODIAZEPINES     200 ng/mL  COCAINE      300 ng/mL  METHADONE      300 ng/mL  OPIATES      300 ng/mL  PHENCYCLIDINE     25 ng/mL  THC       50 ng/mL    Comprehensive metabolic panel [[id number]] Collected:  05/12/18 1755    Lab Status:  Final result Specimen:  Blood from Arm, Right Updated:  05/12/18 1824     Sodium 139 mmol/L      Potassium 3 6 mmol/L      Chloride 104 mmol/L      CO2 25 mmol/L      Anion Gap 10 mmol/L      BUN 11 mg/dL      Creatinine 0 91 mg/dL      Glucose 118 mg/dL      Calcium 9 0 mg/dL      AST 35 U/L      ALT 42 U/L      Alkaline Phosphatase 47 U/L      Total Protein 6 8 g/dL      Albumin 4 1 g/dL      Total Bilirubin 0 40 mg/dL      eGFR 89 ml/min/1 73sq m     Narrative:         National Kidney Disease Education Program recommendations are as follows:  GFR calculation is accurate only with a steady state creatinine  Chronic Kidney disease less than 60 ml/min/1 73 sq  meters  Kidney failure less than 15 ml/min/1 73 sq  meters      Ethanol [[id number]]  (Normal) Collected:  05/12/18 1755    Lab Status:  Final result Specimen:  Blood from Arm, Right Updated:  05/12/18 1819     Ethanol Lvl <3 mg/dL     Magnesium [[id number]]  (Normal) Collected:  05/12/18 1755    Lab Status:  Final result Specimen:  Blood from Arm, Right Updated:  05/12/18 1818     Magnesium 1 7 mg/dL     Protime-INR [[id number]]  (Normal) Collected:  05/12/18 1755    Lab Status:  Final result Specimen:  Blood from Arm, Right Updated:  05/12/18 1817     Protime 14 0 seconds      INR 1 06    APTT [[id number]]  (Normal) Collected:  05/12/18 1755    Lab Status:  Final result Specimen:  Blood from Arm, Right Updated:  05/12/18 1817     PTT 29 seconds     CBC and differential [[id number]] Collected:  05/12/18 1755    Lab Status:  Final result Specimen:  Blood from Arm, Right Updated:  05/12/18 1807     WBC 6 14 Thousand/uL      RBC 4 24 Million/uL      Hemoglobin 12 3 g/dL      Hematocrit 38 1 %      MCV 90 fL      MCH 29 0 pg      MCHC 32 3 g/dL      RDW 14 3 %      MPV 9 9 fL      Platelets 877 Thousands/uL      nRBC 0 /100 WBCs      Neutrophils Relative 56 %      Lymphocytes Relative 33 %      Monocytes Relative 9 %      Eosinophils Relative 0 %      Basophils Relative 1 %      Neutrophils Absolute 3 46 Thousands/µL      Lymphocytes Absolute 2 04 Thousands/µL      Monocytes Absolute 0 57 Thousand/µL      Eosinophils Absolute 0 02 Thousand/µL      Basophils Absolute 0 03 Thousands/µL                  No orders to display              Procedures  Procedures       Phone Contacts  ED Phone Contact    ED Course                               MDM  Number of Diagnoses or Management Options  Diagnosis management comments: Impression:  Overdose on Tylenol the  Suspect that overdose was intentional the although the patient is denying at this time  She has a flat affect, is not making eye contact, and is providing very little history  And Plan:  Toxic overdose evaluation, if nontoxic ingestion, will consult crisis      CritCare Time    Disposition  Final diagnoses:   Acetaminophen overdose of undetermined intent, initial encounter     Time reflects when diagnosis was documented in both MDM as applicable and the Disposition within this note     Time User Action Codes Description Comment    5/12/2018  7:00 PM Helen Miller Add [[id number]] Acetaminophen overdose of undetermined intent, initial encounter       ED Disposition     ED Disposition Condition Comment    Admit  Case was discussed with MARLA and the patient's admission status was agreed to be Admission Status: observation status to the service of Dr Roberto Guzman Ink    None       Patient's Medications   Discharge Prescriptions    No medications on file     No discharge procedures on file      ED Provider  Electronically Signed by           Nathen Guillen MD  05/12/18 9970

## 2018-05-12 NOTE — LETTER
[address] [address]  Dept: [phone]    May 14, 2018     Patient: Maday Fuller   YOB: 1995   Date of Visit: 5/12/2018       To Whom it May Concern:    Maday Fuller is under my professional care  She was seen in the hospital from 5/12/2018   to 05/14/18  She Was discharged on 05/14 to a 2nd facility, may return to work as judged by 2nd facility       If you have any questions or concerns, please don't hesitate to call [phone]         Sincerely,          Ralf Case PA-C

## 2018-05-13 LAB
ALBUMIN SERPL BCP-MCNC: 3.2 G/DL (ref 3.5–5)
ALP SERPL-CCNC: 39 U/L (ref 46–116)
ALT SERPL W P-5'-P-CCNC: 39 U/L (ref 12–78)
AMMONIA PLAS-SCNC: <10 UMOL/L (ref 11–35)
AMPHETAMINES SERPL QL SCN: NEGATIVE
AMPHETAMINES SERPL QL SCN: NEGATIVE
ANION GAP SERPL CALCULATED.3IONS-SCNC: 11 MMOL/L (ref 4–13)
APAP SERPL-MCNC: 61.9 UG/ML (ref 10–30)
AST SERPL W P-5'-P-CCNC: 25 U/L (ref 5–45)
ATRIAL RATE: 81 BPM
BACTERIA UR QL AUTO: ABNORMAL /HPF
BARBITURATES UR QL: NEGATIVE
BARBITURATES UR QL: NEGATIVE
BASOPHILS # BLD AUTO: 0.02 THOUSANDS/ΜL (ref 0–0.1)
BASOPHILS NFR BLD AUTO: 0 % (ref 0–1)
BENZODIAZ UR QL: NEGATIVE
BENZODIAZ UR QL: NEGATIVE
BILIRUB SERPL-MCNC: 0.5 MG/DL (ref 0.2–1)
BILIRUB UR QL STRIP: NEGATIVE
BUN SERPL-MCNC: 6 MG/DL (ref 5–25)
CA-I BLD-SCNC: 1.09 MMOL/L (ref 1.12–1.32)
CALCIUM SERPL-MCNC: 8.1 MG/DL (ref 8.3–10.1)
CHLORIDE SERPL-SCNC: 107 MMOL/L (ref 100–108)
CLARITY UR: CLEAR
CO2 SERPL-SCNC: 20 MMOL/L (ref 21–32)
COCAINE UR QL: NEGATIVE
COCAINE UR QL: NEGATIVE
COLOR UR: YELLOW
CREAT SERPL-MCNC: 0.85 MG/DL (ref 0.6–1.3)
EOSINOPHIL # BLD AUTO: 0 THOUSAND/ΜL (ref 0–0.61)
EOSINOPHIL NFR BLD AUTO: 0 % (ref 0–6)
ERYTHROCYTE [DISTWIDTH] IN BLOOD BY AUTOMATED COUNT: 14.1 % (ref 11.6–15.1)
GFR SERPL CREATININE-BSD FRML MDRD: 97 ML/MIN/1.73SQ M
GLUCOSE SERPL-MCNC: 104 MG/DL (ref 65–140)
GLUCOSE SERPL-MCNC: 125 MG/DL (ref 65–140)
GLUCOSE UR STRIP-MCNC: NEGATIVE MG/DL
HCT VFR BLD AUTO: 38.6 % (ref 34.8–46.1)
HGB BLD-MCNC: 12.5 G/DL (ref 11.5–15.4)
HGB UR QL STRIP.AUTO: ABNORMAL
INR PPP: 1.28 (ref 0.86–1.16)
KETONES UR STRIP-MCNC: ABNORMAL MG/DL
LEUKOCYTE ESTERASE UR QL STRIP: NEGATIVE
LYMPHOCYTES # BLD AUTO: 1.98 THOUSANDS/ΜL (ref 0.6–4.47)
LYMPHOCYTES NFR BLD AUTO: 33 % (ref 14–44)
MAGNESIUM SERPL-MCNC: 1.8 MG/DL (ref 1.6–2.6)
MCH RBC QN AUTO: 28.8 PG (ref 26.8–34.3)
MCHC RBC AUTO-ENTMCNC: 32.4 G/DL (ref 31.4–37.4)
MCV RBC AUTO: 89 FL (ref 82–98)
METHADONE UR QL: NEGATIVE
METHADONE UR QL: NEGATIVE
MONOCYTES # BLD AUTO: 0.34 THOUSAND/ΜL (ref 0.17–1.22)
MONOCYTES NFR BLD AUTO: 6 % (ref 4–12)
NEUTROPHILS # BLD AUTO: 3.62 THOUSANDS/ΜL (ref 1.85–7.62)
NEUTS SEG NFR BLD AUTO: 61 % (ref 43–75)
NITRITE UR QL STRIP: NEGATIVE
NON-SQ EPI CELLS URNS QL MICRO: ABNORMAL /HPF
NRBC BLD AUTO-RTO: 0 /100 WBCS
OPIATES UR QL SCN: NEGATIVE
OPIATES UR QL SCN: NEGATIVE
P AXIS: 74 DEGREES
PCP UR QL: NEGATIVE
PCP UR QL: NEGATIVE
PH UR STRIP.AUTO: 6 [PH] (ref 4.5–8)
PHOSPHATE SERPL-MCNC: 3.8 MG/DL (ref 2.7–4.5)
PLATELET # BLD AUTO: 231 THOUSANDS/UL (ref 149–390)
PMV BLD AUTO: 9.6 FL (ref 8.9–12.7)
POTASSIUM SERPL-SCNC: 3.9 MMOL/L (ref 3.5–5.3)
PR INTERVAL: 152 MS
PROT SERPL-MCNC: 6.1 G/DL (ref 6.4–8.2)
PROT UR STRIP-MCNC: NEGATIVE MG/DL
PROTHROMBIN TIME: 16.3 SECONDS (ref 12.1–14.4)
QRS AXIS: 81 DEGREES
QRSD INTERVAL: 86 MS
QT INTERVAL: 398 MS
QTC INTERVAL: 462 MS
RBC # BLD AUTO: 4.34 MILLION/UL (ref 3.81–5.12)
RBC #/AREA URNS AUTO: ABNORMAL /HPF
SODIUM SERPL-SCNC: 138 MMOL/L (ref 136–145)
SP GR UR STRIP.AUTO: 1.02 (ref 1–1.03)
T WAVE AXIS: 45 DEGREES
THC UR QL: NEGATIVE
THC UR QL: NEGATIVE
UROBILINOGEN UR QL STRIP.AUTO: 0.2 E.U./DL
VENTRICULAR RATE: 81 BPM
WBC # BLD AUTO: 5.97 THOUSAND/UL (ref 4.31–10.16)
WBC #/AREA URNS AUTO: ABNORMAL /HPF

## 2018-05-13 PROCEDURE — 80053 COMPREHEN METABOLIC PANEL: CPT | Performed by: NURSE PRACTITIONER

## 2018-05-13 PROCEDURE — 80329 ANALGESICS NON-OPIOID 1 OR 2: CPT | Performed by: NURSE PRACTITIONER

## 2018-05-13 PROCEDURE — 84100 ASSAY OF PHOSPHORUS: CPT | Performed by: NURSE PRACTITIONER

## 2018-05-13 PROCEDURE — 83735 ASSAY OF MAGNESIUM: CPT | Performed by: NURSE PRACTITIONER

## 2018-05-13 PROCEDURE — 85610 PROTHROMBIN TIME: CPT | Performed by: NURSE PRACTITIONER

## 2018-05-13 PROCEDURE — 82948 REAGENT STRIP/BLOOD GLUCOSE: CPT

## 2018-05-13 PROCEDURE — 80307 DRUG TEST PRSMV CHEM ANLYZR: CPT | Performed by: NURSE PRACTITIONER

## 2018-05-13 PROCEDURE — 85025 COMPLETE CBC W/AUTO DIFF WBC: CPT | Performed by: NURSE PRACTITIONER

## 2018-05-13 PROCEDURE — 82140 ASSAY OF AMMONIA: CPT | Performed by: NURSE PRACTITIONER

## 2018-05-13 PROCEDURE — 93010 ELECTROCARDIOGRAM REPORT: CPT | Performed by: INTERNAL MEDICINE

## 2018-05-13 PROCEDURE — 82330 ASSAY OF CALCIUM: CPT | Performed by: NURSE PRACTITIONER

## 2018-05-13 RX ADMIN — FAMOTIDINE 20 MG: 20 TABLET ORAL at 18:43

## 2018-05-13 RX ADMIN — FAMOTIDINE 20 MG: 20 TABLET ORAL at 09:31

## 2018-05-13 RX ADMIN — HEPARIN SODIUM 5000 UNITS: 5000 INJECTION, SOLUTION INTRAVENOUS; SUBCUTANEOUS at 06:58

## 2018-05-13 RX ADMIN — CHLORHEXIDINE GLUCONATE 15 ML: 1.2 RINSE ORAL at 09:29

## 2018-05-13 RX ADMIN — ACETYLCYSTEINE 4820 MG: 6 INJECTION, SOLUTION INTRAVENOUS at 00:50

## 2018-05-13 RX ADMIN — HEPARIN SODIUM 5000 UNITS: 5000 INJECTION, SOLUTION INTRAVENOUS; SUBCUTANEOUS at 15:52
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
## 2018-05-13 NOTE — PROGRESS NOTES
Patient has completed her course of N-acetylcysteine  Her LFTs were never elevated  Acetaminophen level downtrended,  Patient is medically clear for transfer to inpatient psychiatric facility

## 2018-05-13 NOTE — ED NOTES
Pt started acting more lethargic  Pt sat up and began vomiting brown bile contents on floor  MD present at bedside   Pt also noted to be hypotensive at this point     Kaila Garcia RN  05/12/18 2026

## 2018-05-13 NOTE — PROGRESS NOTES
Critical Care Medicine:  Significant Event  Carter Trevino  /  05/12/18    Called to see patient emergently due to low saturations in the 70s and altered mental status  Narcan 1 mg IV push given with positive result-fully awake and saturations of 98%  Patient again nauseous and with episode of vomiting  Advised patient that she may require additional doses of Narcan, dependent on her response  Continue to monitor closely  Visitors restricted upon admission to the step-down unit per the nursing supervisor  Security made aware      AURY Irvin

## 2018-05-13 NOTE — ED NOTES
Pt  Radha Singh in bed, relaxing and showing no signs of distress at this time  Pt  intermittently spitting into basin and stating, "I don't feel good" Side rails up and bed is lowest position for pt  Safety  Will continue to monitor       Nika Santiago  05/12/18 7453

## 2018-05-13 NOTE — ED NOTES
Pt  Throwing up brown bile and stating she can't breathe  O2 sat 97% on room air       Amee Thurston  05/12/18 1276

## 2018-05-13 NOTE — ED NOTES
Call placed to [facility], spoke with Sheila who stated she would check with the unit regarding bed availability and return call       SHALINI Arredondo  05/13/18   2600

## 2018-05-13 NOTE — ED NOTES
Pt  Neel Mccrary in bed, appears to be sleeping  Will continue to monitor       Rufina Form  05/12/18 7108
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
## 2018-05-13 NOTE — ED NOTES
Pt continuously vomiting bile at this point  Basin at bedside, fluids infuising, Pt yelling " pump my stomach!" Pt educated by myself and Ailyn DAVIDSON multiple times that we would not be pumping her stomach        Aylin Everett RN  05/12/18 2107       Aylin Everett RN  05/12/18 2110

## 2018-05-13 NOTE — ED NOTES
No beds available in-network ([facility], [facility], [facility], [facility]); bed search to following facilities:     [place]: No beds  [facility]: [facility] Beds   [place]: No beds available  [facility]: No beds available  [facility]: No beds available  [facility]: No Beds available  [place]: No beds available  [facility]: Spoke with [facility], no beds available  Bed search to continue tomorrow       SHALINI Nolasco  05/13/18   [id number]

## 2018-05-13 NOTE — PROGRESS NOTES
Progress Note - Critical Care   Jeanna Cardoso 21 y o  female MRN: [id number]  Unit/Bed#:  Encounter: [id number]    Attending Physician: Lisa Alvarez MD      ______________________________________________________________________  Assessment and Plan:   Principal Problem:    Intentional acetaminophen overdose [facility])  Active Problems:    Tylenol toxicity    Hypotension    Metabolic encephalopathy    Suicidal behavior with attempted self-injury ([facility] [address] )    Vaginal candidiasis    Respiratory depression  Resolved Problems:    * No resolved hospital problems  *        Neuro:  Serial neuro exams  Psychiatric evaluation pending  One-to-one observation and suicide precautions  Visitor restriction and affect due to patient's safety issues  CV:  Hemodynamically stable  Continue to monitor  Pulm:  Occasional periods of hypoxia associated with altered mental status  Continue supplemental oxygen to maintain saturations greater than 94%  Narcan as needed  GI:  H2 blocker for GI protection  Continue to monitor hepatic functions  Mucomyst to complete around noon time today  Tylenol level down trending  :  Lotrimin for vaginal candidiasis  No indication to continue Flagyl  F/E/N:  Monitor electrolytes and replete as needed  ID:  Lotrimin for vaginal candidiasis  Monitor fever and leukocyte curves  No immediate need for antibiotic treatment  Heme:  Hemoglobin and platelet count stable  Endo:  Continue to monitor glucoses on routine lab work  Msk/Skin:  Turn and reposition q 2 hours  Mobilize out of bed  Disposition:  Continue step-down level of care  Code Status: Level 1 - Full Code    Counseling / Coordination of Care  Total time spent today 32 minutes   Greater than 50% of total time was spent with the patient and / or family counseling and / or coordination of care    ______________________________________________________________________    24 Hour Events:   Patient may yesterday after admitting to suicidal ideation and attempt with overdose of 9 g of Tylenol  Course was complicated by altered mental status and hypoxia relieved with Narcan       ______________________________________________________________________    Physical Exam:   GEN:  Well nourished, well developed, appears stated age, no acute distress  HEENT:  Sclera anicteric, mucous membranes pink and moist, conjunctiva pink, no veena/rhinorrhea  Neck:  No lymphadenopathy, normal ROM, supple, no bruits  CV :  S1S2, no murmurs, rubs or gallops  Intact distal pulses  No JVD  Resp:  Lungs CTA =B/L, diminished throughout     No subq air or crepitus  Symmetrical expansion  No cough noted  GI :  Abd soft, nontender, no guarding/rebound, nondistended, normoactive bowel sounds X4 quads, no organomegaly appreciated  Neuro:  CN II-XII grossly intact, nonfocal exam, speech clear, GCS 15   Appears lethargic at times  Psych: Withdrawn affect, poor insight       ______________________________________________________________________  Blood pressure 103/63, pulse 70, temperature 98 2 °F (36 8 °C), temperature source Oral, resp  rate 16, weight 49 5 kg (109 lb 2 oz), SpO2 99 %  Temperature:   Temp (24hrs), Av 9 °F (36 6 °C), Min:97 6 °F (36 4 °C), Max:98 2 °F (36 8 °C)    Current Temperature: 98 2 °F (36 8 °C)  Weights:   IBW: -92 5 kg    Body mass index is 19 33 kg/m²    Weight (last 2 days)     Date/Time   Weight    18 2108  49 5 (109 13)              Non-Invasive/Invasive Ventilation Settings:  Respiratory    Lab Data (Last 4 hours)    None         O2/Vent Data (Last 4 hours)    None              No results found for: PHART, GNB2VUH, PO2ART, WGA4ZDZ, S5FDYMMT, BEART, SOURCE  SpO2: SpO2: 99 %  Intake and Outputs:  I/O        07 -  0700 701 -  0700    I V  (mL/kg)  183 3 (3 7)    IV Piggyback  2200    Total Intake(mL/kg)  2383 3 (48 1)    Urine (mL/kg/hr)  400    Total Output   400    Net +6861 4                Nutrition:        Diet Orders            Start     Ordered    05/12/18 2105  Diet NPO  Diet effective now     Question Answer Comment   Diet Type NPO    RD to adjust diet per protocol? No        05/12/18 2107          Labs:   Pending      Imaging:  None today  EKG:  Sinus rhythm on cardiac monitor  Micro:  No results found for: Kev Justice, WOUNDCULT, SPUTUMCULTUR  Allergies: No Known Allergies  Medications:   Scheduled Meds:  Current Facility-Administered Medications:  acetylcysteine 100 mg/kg Intravenous Once Harinder Worley MD Last Rate: 4,820 mg (05/13/18 0050)   chlorhexidine 15 mL Swish & Spit Q12H [address] [place], CRNP    clotrimazole 1 applicator Vaginal HS [place], CRNP    famotidine 20 mg Oral BID [place], CRNP    Or        famotidine 20 mg Intravenous BID [place], CRNP    heparin (porcine) 5,000 Units Subcutaneous [facility] [address] AURY Chambers    naloxone 1 mg Intravenous Q5 Min PRN [place], CRNP    sodium chloride 100 mL/hr Intravenous Continuous [place], CRNP Last Rate: 100 mL/hr (05/12/18 2210)     Continuous Infusions:  sodium chloride 100 mL/hr Last Rate: 100 mL/hr (05/12/18 2210)     PRN Meds:    naloxone 1 mg Q5 Min PRN     VTE Pharmacologic Prophylaxis: Heparin  VTE Mechanical Prophylaxis: sequential compression device  Invasive lines and devices: Invasive Devices     Peripheral Intravenous Line            Peripheral IV 05/12/18 Left Forearm less than 1 day    Peripheral IV 05/12/18 Right Antecubital less than 1 day                     Portions of the record may have been created with voice recognition software  Occasional wrong word or "sound a like" substitutions may have occurred due to the inherent limitations of voice recognition software    Read the chart carefully and recognize, using context, where substitutions have occurred      AURY Somers

## 2018-05-14 VITALS
DIASTOLIC BLOOD PRESSURE: 70 MMHG | HEIGHT: 63 IN | RESPIRATION RATE: 18 BRPM | WEIGHT: 109.13 LBS | BODY MASS INDEX: 19.34 KG/M2 | OXYGEN SATURATION: 98 % | TEMPERATURE: 97.7 F | SYSTOLIC BLOOD PRESSURE: 114 MMHG | HEART RATE: 64 BPM

## 2018-05-14 RX ORDER — IBUPROFEN 400 MG/1
400 TABLET ORAL EVERY 6 HOURS PRN
Status: DISCONTINUED | OUTPATIENT
Start: 2018-05-14 | End: 2018-05-14 | Stop reason: HOSPADM

## 2018-05-14 RX ADMIN — IBUPROFEN 400 MG: 400 TABLET, FILM COATED ORAL at 18:29

## 2018-05-14 RX ADMIN — FAMOTIDINE 20 MG: 20 TABLET ORAL at 18:29

## 2018-05-14 RX ADMIN — FAMOTIDINE 20 MG: 20 TABLET ORAL at 09:33
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
## 2018-05-14 NOTE — SOCIAL WORK
CM updated ICU AP  CM receive cb from [facility] to complete precert  Pt was authorized for 3 days, 5/14-5/16 Cook Pickup #[id number]  YENNI s/w Benigno Marin and provided Lesli Pickup # and transport time
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
## 2018-05-14 NOTE — SOCIAL WORK
LOS: 2 Per ICU AP pt is medically stable for dc to IP Psych  Complex CM s/w Sepideh Meredith who forwarded 302  CM lm for [facility] and [facility]  CM lm for [facility]  [facility] of [facility]  CM lm for [id number] [facility] s/w Jacque Vogel at [facility] who requested faxed clinicals, CM faxed  CM s/w Sheba Garcia who requested faxed clinicals- CM faxed  CM awaiting cb and determination

## 2018-05-14 NOTE — DISCHARGE SUMMARY
Discharge Summary   Braydon Richter 21 y o  female MRN: [id number]    [facility] ICU Room / Bed: / Encounter: [id number]      Admitting Provider: Ivan Mcdowell MD  Discharge Provider: Ivan Mcdowell MD  Admission Date: 5/12/2018     Discharge Date: No discharge date for patient encounter  Primary Care Physician at Discharge: Saira Reyes, [address]    Primary Discharge Diagnosis  Principal Problem:    Intentional acetaminophen overdose [facility])  Active Problems:    Tylenol toxicity    Hypotension    Metabolic encephalopathy    Suicidal behavior with attempted self-injury ([facility] [address] )    Vaginal candidiasis    Respiratory depression  Resolved Problems:    * No resolved hospital problems   *      Other Problems Addressed  Patient Active Problem List    Diagnosis Date Noted    Intentional acetaminophen overdose ([facility] [address] ) 05/12/2018    Tylenol toxicity 05/12/2018    Hypotension 77/28/2397    Metabolic encephalopathy 30/14/1778    Suicidal behavior with attempted self-injury ([facility] [address] ) 05/12/2018    Vaginal candidiasis 05/12/2018    Respiratory depression 05/12/2018       Consulting Providers   Psychiatry    Diagnostic Procedures Performed   On  Treatments   N acetylcysteine for Tylenol toxicity, Lotrimin for vaginal candidiasis  Procedures   None  Other Pertinent Test Results  Lab Results   Component Value Date    WBC 5 97 05/13/2018    HGB 12 5 05/13/2018    HCT 38 6 05/13/2018    MCV 89 05/13/2018     05/13/2018     Lab Results   Component Value Date    GLUCOSE 125 05/13/2018    CALCIUM 8 1 (L) 05/13/2018     05/13/2018    K 3 9 05/13/2018    CO2 20 (L) 05/13/2018     05/13/2018    BUN 6 05/13/2018    CREATININE 0 85 05/13/2018     Lab Results   Component Value Date    CALCIUM 8 1 (L) 05/13/2018    PHOS 3 8 05/13/2018     Lab Results   Component Value Date     05/13/2018    K 3 9 05/13/2018     05/13/2018    CO2 20 (L) 05/13/2018    ANIONGAP 11 05/13/2018    BUN 6 05/13/2018    CREATININE 0 85 05/13/2018    GLUCOSE 125 05/13/2018    GLUF 78 04/27/2018    CALCIUM 8 1 (L) 05/13/2018    AST 25 05/13/2018    ALT 39 05/13/2018    ALKPHOS 39 (L) 05/13/2018    PROT 6 1 (L) 05/13/2018    BILITOT 0 50 05/13/2018    EGFR 97 05/13/2018     Lab Results   Component Value Date    INR 1 28 (H) 05/13/2018    INR 1 06 05/12/2018    PROTIME 16 3 (H) 05/13/2018    PROTIME 14 0 05/12/2018     Lab Results   Component Value Date    ALT 39 05/13/2018    AST 25 05/13/2018    ALKPHOS 39 (L) 05/13/2018    BILITOT 0 50 05/13/2018     No results found for: TSH, I7TOEDU, N9XHRXR, THYROIDAB  No results found for: HGBA1C  @lastcardiac@    Presenting Problem/History of Present Illness  Intentional acetaminophen overdose [facility])    Hospital Course  55-year-old female with past medical history significant for multiple psychiatric admissions in [place] admitted on 05/12/2018 through the emergency department after self admitted overdose of Tylenol in an effort to harm herself  She given several inconsistent accounts as to the reason for her Tylenol ingestion, but eventually admitted that she felt unloved "  She was placed on suicide precautions with one-to-one sitter  N acetylcysteine was administered successfully, without transaminitis resulting  Psychiatric evaluation was performed and it was deemed that she requires inpatient hospitalization for her protection while she is being treated  302 was signed and in chart  She is currently medically cleared and stable for psychiatric treatment  Patient will be discharged to [facility] for inpatient treatment  Physical Exam  Vitals: Blood pressure 120/80, pulse 83, temperature 98 4 °F (36 9 °C), temperature source Oral, resp  rate 20, height 5' 3" (1 6 m), weight 49 5 kg (109 lb 2 oz), SpO2 100 %  ,Body mass index is 19 33 kg/m²          Discharge Disposition: Home/Self Care  Facility:   Discharged With Lines: no    Test Results Pending at Discharge  Hydrocodone metabolites  Medications   See after visit summary for reconciled discharge medications provided to patient and family  Allergies  No Known Allergies  Diet restrictions: none  Activity restrictions: none  Discharge Condition: stable      Outpatient Follow-Up  Psychiatry and primary care      Code Status: Level 1 - Full Code  Advance Directive and Living Will: <no information>  Power of :    POLST:      Discharge  Statement   I spent 30 minutes discharging the patient  This time was spent on the day of discharge  I had direct contact with the patient on the day of discharge  Additional documentation is required if more than 30 minutes were spent on discharge

## 2018-05-14 NOTE — SOCIAL WORK
YENNI s/w [facility] who agreed to accept pt  Per [place] accepting is Dr Skye Kent CM s/w Valli Hammans and setup transport for 1730  CM phoned [facility] and pt is active with medical assistance  YENNI initiated precert with [facility], [phone] and is awaiting cb from Care Manager
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
## 2018-05-15 LAB
AMPHETAMINES UR QL SCN: NEGATIVE NG/ML
BARBITURATES UR QL SCN: NEGATIVE NG/ML
BENZODIAZ UR QL SCN: NEGATIVE NG/ML
BZE UR QL: NEGATIVE NG/ML
CANNABINOIDS UR QL SCN: NEGATIVE NG/ML
METHADONE UR QL SCN: NEGATIVE NG/ML
OPIATES UR QL: NEGATIVE NG/ML
PCP UR QL: NEGATIVE NG/ML
PROPOXYPH UR QL: NEGATIVE NG/ML

## 2018-05-15 NOTE — CASE MANAGEMENT
Notification of Discharge  This is a Notification of Discharge from our facility [address]  Please be advised that this patient has been discharge from our facility  Below you will find the admission and discharge date and time including the patients disposition  PRESENTATION DATE: 5/12/2018  5:27 PM  IP ADMISSION DATE: 5/12/18 1921  DISCHARGE DATE: 5/14/2018  8:00 PM  DISPOSITION: Non [facility] [address]  [facility] in the [facility] by Osvaldo Ford for 2017  Network Utilization Review Department  Phone: [phone]; Fax [phone]  ATTENTION: The Network Utilization Review Department is now centralized for our 7 Facilities  Make a note that we have a new phone and fax numbers for our Department  Please call with any questions or concerns to [phone] and carefully follow the prompts so that you are directed to the right person  All voicemails are confidential  Fax any determinations, approvals, denials, and requests for initial or continue stay review clinical to [phone]  Due to HIGH CALL volume, it would be easier if you could please send faxed requests to expedite your requests and in part, help us provide discharge notifications faster

## 2018-05-18 LAB
HYDROCODONE UR QL: NEGATIVE NG/ML
HYDROMORPHONE UR QL: NEGATIVE NG/ML

## 2018-08-31 ENCOUNTER — HOSPITAL ENCOUNTER (EMERGENCY)
Facility: HOSPITAL | Age: 23
Discharge: HOME | End: 2018-08-31
Attending: EMERGENCY MEDICINE | Admitting: EMERGENCY MEDICINE
Payer: COMMERCIAL

## 2018-08-31 VITALS
DIASTOLIC BLOOD PRESSURE: 61 MMHG | HEIGHT: 63 IN | OXYGEN SATURATION: 100 % | RESPIRATION RATE: 18 BRPM | HEART RATE: 83 BPM | SYSTOLIC BLOOD PRESSURE: 112 MMHG

## 2018-08-31 DIAGNOSIS — E16.2 HYPOGLYCEMIA: Primary | ICD-10-CM

## 2018-08-31 LAB
ANION GAP SERPL CALCULATED.3IONS-SCNC: 15 MMOL/L (ref 4–13)
ATRIAL RATE: 94 BPM
BASOPHILS # BLD AUTO: 0.03 THOUSANDS/ΜL (ref 0–0.1)
BASOPHILS NFR BLD AUTO: 0 % (ref 0–1)
BILIRUB UR QL STRIP: NEGATIVE
BUN SERPL-MCNC: 19 MG/DL (ref 5–25)
CALCIUM SERPL-MCNC: 9.4 MG/DL (ref 8.3–10.1)
CHLORIDE SERPL-SCNC: 101 MMOL/L (ref 100–108)
CLARITY UR: CLEAR
CO2 SERPL-SCNC: 22 MMOL/L (ref 21–32)
COLOR UR: YELLOW
CREAT SERPL-MCNC: 1.02 MG/DL (ref 0.6–1.3)
EOSINOPHIL # BLD AUTO: 0.08 THOUSAND/ΜL (ref 0–0.61)
EOSINOPHIL NFR BLD AUTO: 1 % (ref 0–6)
ERYTHROCYTE [DISTWIDTH] IN BLOOD BY AUTOMATED COUNT: 12.9 % (ref 11.6–15.1)
EXT PREG TEST URINE: NEGATIVE
GFR SERPL CREATININE-BSD FRML MDRD: 78 ML/MIN/1.73SQ M
GLUCOSE SERPL-MCNC: 181 MG/DL (ref 65–140)
GLUCOSE SERPL-MCNC: 189 MG/DL (ref 65–140)
GLUCOSE SERPL-MCNC: 45 MG/DL (ref 65–140)
GLUCOSE SERPL-MCNC: 58 MG/DL (ref 65–140)
GLUCOSE UR STRIP-MCNC: ABNORMAL MG/DL
HCT VFR BLD AUTO: 40.6 % (ref 34.8–46.1)
HGB BLD-MCNC: 13 G/DL (ref 11.5–15.4)
HGB UR QL STRIP.AUTO: NEGATIVE
IMM GRANULOCYTES # BLD AUTO: 0.03 THOUSAND/UL (ref 0–0.2)
IMM GRANULOCYTES NFR BLD AUTO: 0 % (ref 0–2)
KETONES UR STRIP-MCNC: ABNORMAL MG/DL
LEUKOCYTE ESTERASE UR QL STRIP: NEGATIVE
LYMPHOCYTES # BLD AUTO: 3 THOUSANDS/ΜL (ref 0.6–4.47)
LYMPHOCYTES NFR BLD AUTO: 27 % (ref 14–44)
MCH RBC QN AUTO: 28.7 PG (ref 26.8–34.3)
MCHC RBC AUTO-ENTMCNC: 32 G/DL (ref 31.4–37.4)
MCV RBC AUTO: 90 FL (ref 82–98)
MONOCYTES # BLD AUTO: 0.49 THOUSAND/ΜL (ref 0.17–1.22)
MONOCYTES NFR BLD AUTO: 4 % (ref 4–12)
NEUTROPHILS # BLD AUTO: 7.46 THOUSANDS/ΜL (ref 1.85–7.62)
NEUTS SEG NFR BLD AUTO: 68 % (ref 43–75)
NITRITE UR QL STRIP: NEGATIVE
NRBC BLD AUTO-RTO: 0 /100 WBCS
P AXIS: 75 DEGREES
PH UR STRIP.AUTO: 6 [PH] (ref 4.5–8)
PLATELET # BLD AUTO: 225 THOUSANDS/UL (ref 149–390)
PMV BLD AUTO: 10.8 FL (ref 8.9–12.7)
POTASSIUM SERPL-SCNC: 3.9 MMOL/L (ref 3.5–5.3)
PR INTERVAL: 144 MS
PROT UR STRIP-MCNC: NEGATIVE MG/DL
QRS AXIS: 81 DEGREES
QRSD INTERVAL: 88 MS
QT INTERVAL: 394 MS
QTC INTERVAL: 492 MS
RBC # BLD AUTO: 4.53 MILLION/UL (ref 3.81–5.12)
SODIUM SERPL-SCNC: 138 MMOL/L (ref 136–145)
SP GR UR STRIP.AUTO: 1.02 (ref 1–1.03)
T WAVE AXIS: 35 DEGREES
TSH SERPL DL<=0.05 MIU/L-ACNC: 0.48 UIU/ML (ref 0.36–3.74)
UROBILINOGEN UR QL STRIP.AUTO: 0.2 E.U./DL
VENTRICULAR RATE: 94 BPM
WBC # BLD AUTO: 11.09 THOUSAND/UL (ref 4.31–10.16)

## 2018-08-31 PROCEDURE — 93010 ELECTROCARDIOGRAM REPORT: CPT | Performed by: INTERNAL MEDICINE

## 2018-08-31 PROCEDURE — 36415 COLL VENOUS BLD VENIPUNCTURE: CPT | Performed by: EMERGENCY MEDICINE

## 2018-08-31 PROCEDURE — 96374 THER/PROPH/DIAG INJ IV PUSH: CPT

## 2018-08-31 PROCEDURE — 99285 EMERGENCY DEPT VISIT HI MDM: CPT

## 2018-08-31 PROCEDURE — 81025 URINE PREGNANCY TEST: CPT | Performed by: EMERGENCY MEDICINE

## 2018-08-31 PROCEDURE — 93005 ELECTROCARDIOGRAM TRACING: CPT

## 2018-08-31 PROCEDURE — 82948 REAGENT STRIP/BLOOD GLUCOSE: CPT

## 2018-08-31 PROCEDURE — 80048 BASIC METABOLIC PNL TOTAL CA: CPT | Performed by: EMERGENCY MEDICINE

## 2018-08-31 PROCEDURE — 81003 URINALYSIS AUTO W/O SCOPE: CPT | Performed by: EMERGENCY MEDICINE

## 2018-08-31 PROCEDURE — 84443 ASSAY THYROID STIM HORMONE: CPT | Performed by: EMERGENCY MEDICINE

## 2018-08-31 PROCEDURE — 85025 COMPLETE CBC W/AUTO DIFF WBC: CPT | Performed by: EMERGENCY MEDICINE

## 2018-08-31 RX ORDER — DEXTROSE MONOHYDRATE 25 G/50ML
INJECTION, SOLUTION INTRAVENOUS
Status: COMPLETED
Start: 2018-08-31 | End: 2018-08-31

## 2018-08-31 RX ADMIN — DEXTROSE MONOHYDRATE 50 ML: 25 INJECTION, SOLUTION INTRAVENOUS at 15:15

## 2018-08-31 NOTE — DISCHARGE INSTRUCTIONS
Non-diabetic Hypoglycemia   WHAT YOU NEED TO KNOW:   Non-diabetic hypoglycemia is a condition that causes the sugar (glucose) in your blood to drop too low  This can happen in people who do not have diabetes  The 2 types of non-diabetic hypoglycemia are fasting hypoglycemia and reactive hypoglycemia  Fasting hypoglycemia often happens after a person goes without food for 8 hours or longer  Reactive hypoglycemia usually happens about 2 to 4 hours after a meal  When your blood sugar level is low, your muscles and brain cells do not have enough energy to work well  DISCHARGE INSTRUCTIONS:   Follow up with your healthcare provider as directed:  Write down your questions so you remember to ask them during your visits  Keep carbohydrates with you:  Carbohydrates will raise your blood sugar level when you have symptoms of hypoglycemia  Carbohydrates are found in bread, rice, cereal, fruits, juice, and milk  Prevent hypoglycemia:  You may need to change what and when you eat to prevent low blood sugar levels  Follow the meal plan that you and the dietitian have created  The following guidelines may help you keep your blood sugar levels under control  · Eat 5 to 6 small meals each day instead of 3 large meals  Eat the same amount of carbohydrate at meals and snacks each day  Most people need about 3 to 4 servings of carbohydrate at meals and 1 to 2 servings for snacks  Do not skip meals  Carbohydrate counting can be used plan your meals  Ask your healthcare provider or dietitian for information about carbohydrate counting  · Limit refined carbohydrates  Examples are white bread, pastries (pies and cakes), regular sodas, syrups, and candy  · Do not have drinks or foods that contain caffeine  Examples are coffee, tea, and certain types of sodas  Caffeine may cause you to have the same symptoms as hypoglycemia, and may cause you to feel worse  · Limit or do not drink alcohol    Women should limit alcohol to 1 drink a day  Men should limit alcohol to 2 drinks a day  A drink of alcohol is 12 ounces of beer, 5 ounces of wine, or 1½ ounces of liquor  Do not drink alcohol on an empty stomach  Drink alcohol with meals to prevent hypoglycemia  · Include protein foods and vegetables in your meals  Some foods that are high in protein include beef, pork, fish, poultry (chicken and turkey), beans, and nuts  Eat a variety of vegetables with your meals  Contact your healthcare provider if:   · You have blurred vision or vision changes  · You feel very tired and weak  · You are sweating more than usual for you  · You have a fast heartbeat  · You feel dizzy, lightheaded, and shaky  · You have questions about your condition or care  Return to the emergency department if:   · You have symptoms of hypoglycemia and cannot eat  · You have trouble thinking clearly  · You have a seizure or faint  © 2017 [address]  Information is for End User's use only and may not be sold, redistributed or otherwise used for commercial purposes  All illustrations and images included in CareNotes® are the copyrighted property of A D A M , Inc  or [employer]valdo Ford  The above information is an  only  It is not intended as medical advice for individual conditions or treatments  Talk to your doctor, nurse or pharmacist before following any medical regimen to see if it is safe and effective for you

## 2018-08-31 NOTE — ED NOTES
Pt reports waking up shaky, weak and having heart palpitation   Pt denies h/o any conditions     Sofie Salcedo RN  08/31/18 4142

## 2018-08-31 NOTE — ED PROVIDER NOTES
History  Chief Complaint   Patient presents with    Palpitations     pt states "she woke up shaking today and her heart is racing"      21yo female is coming in after waking up feeling shakey and clammy and feeling some fatigue and chest pain and some palpitations  No headache/abd pain  Pt felt clammy and weak so nurse checked POGT and blood sugar was low  Pt has no h/o diabetes and takes no meds  Has irregular periods at times  Pt states she does not eat regularly, and she last ate last night at 2am and actually vomited it up shortly after, last time she ate hours before that, and pt woke up feeling shakey and clammy and came here and did not eat anything  So pt has not eaten anything in likely 16 hours  No h/o DM and takes no meds  History provided by:  Patient  Palpitations   Palpitations quality:  Fast  Onset quality:  Sudden  Duration:  1 hour  Timing:  Constant  Progression:  Unchanged  Chronicity:  New  Context: dehydration    Context: not hyperventilation and not stimulant use    Relieved by:  Nothing  Worsened by:  Nothing  Ineffective treatments:  None tried  Associated symptoms: chest pain, diaphoresis, dizziness, malaise/fatigue, nausea and vomiting (had 1 small episode of vomiting after she ate a 2am)    Associated symptoms: no back pain, no chest pressure, no cough, no hemoptysis, no leg pain, no lower extremity edema, no near-syncope, no numbness, no shortness of breath, no syncope and no weakness    Risk factors: no diabetes mellitus, no heart disease, no hyperthyroidism and no stress        None       History reviewed  No pertinent past medical history  History reviewed  No pertinent surgical history  History reviewed  No pertinent family history  I have reviewed and agree with the history as documented      Social History   Substance Use Topics    Smoking status: Never Smoker    Smokeless tobacco: Never Used    Alcohol use No      Comment: occasional        Review of Systems Constitutional: Positive for diaphoresis and malaise/fatigue  Respiratory: Negative for cough, hemoptysis and shortness of breath  Cardiovascular: Positive for chest pain  Negative for syncope and near-syncope  Gastrointestinal: Positive for nausea and vomiting (had 1 small episode of vomiting after she ate a 2am)  Musculoskeletal: Negative for back pain  Neurological: Positive for dizziness  Negative for weakness and numbness  All other systems reviewed and are negative  Physical Exam  Physical Exam   Constitutional: She is oriented to person, place, and time  She appears well-developed and well-nourished  No distress  HENT:   Head: Normocephalic and atraumatic  Nose: Nose normal    Mouth/Throat: Oropharynx is clear and moist    Eyes: Conjunctivae and EOM are normal  Pupils are equal, round, and reactive to light  Neck: Normal range of motion  Neck supple  Cardiovascular: Normal rate, regular rhythm, normal heart sounds and intact distal pulses  Pulmonary/Chest: Effort normal and breath sounds normal  No respiratory distress  Abdominal: Soft  Bowel sounds are normal  She exhibits no distension  There is no tenderness  Musculoskeletal: Normal range of motion  She exhibits no edema or tenderness  Old healed cut marks to left forearm   Neurological: She is alert and oriented to person, place, and time  No cranial nerve deficit or sensory deficit  She exhibits normal muscle tone  Skin: Skin is warm and dry  She is not diaphoretic  Psychiatric: She has a normal mood and affect  Nursing note and vitals reviewed        Vital Signs  ED Triage Vitals [08/31/18 1452]   Temp Pulse Respirations Blood Pressure SpO2   -- 89 16 140/72 100 %      Temp Source Heart Rate Source Patient Position - Orthostatic VS BP Location FiO2 (%)   Oral Monitor Sitting Left arm --      Pain Score       No Pain           Vitals:    08/31/18 1452 08/31/18 1515   BP: 140/72 112/61   Pulse: 89 83   Patient Position - Orthostatic VS: Sitting        Visual Acuity      ED Medications  Medications   dextrose 50 % IV solution **AcuDose Override Pull** (50 mL  Given 8/31/18 1515)       Diagnostic Studies  Results Reviewed     Procedure Component Value Units Date/Time    Fingerstick Glucose (POCT) [[id number]]  (Abnormal) Collected:  08/31/18 1712    Lab Status:  Final result Updated:  08/31/18 1713     POC Glucose 181 (H) mg/dl     Fingerstick Glucose (POCT) [[id number]]  (Abnormal) Collected:  08/31/18 1548    Lab Status:  Final result Updated:  08/31/18 1712     POC Glucose 189 (H) mg/dl     Basic metabolic panel [[id number]]  (Abnormal) Collected:  08/31/18 1526    Lab Status:  Final result Specimen:  Blood from Arm, Left Updated:  08/31/18 1601     Sodium 138 mmol/L      Potassium 3 9 mmol/L      Chloride 101 mmol/L      CO2 22 mmol/L      ANION GAP 15 (H) mmol/L      BUN 19 mg/dL      Creatinine 1 02 mg/dL      Glucose 58 (L) mg/dL      Calcium 9 4 mg/dL      eGFR 78 ml/min/1 73sq m     Narrative:         National Kidney Disease Education Program recommendations are as follows:  GFR calculation is accurate only with a steady state creatinine  Chronic Kidney disease less than 60 ml/min/1 73 sq  meters  Kidney failure less than 15 ml/min/1 73 sq  meters  TSH [[id number]]  (Normal) Collected:  08/31/18 1526    Lab Status:  Final result Specimen:  Blood from Arm, Left Updated:  08/31/18 1601     TSH 3RD GENERATON 0 476 uIU/mL     Narrative:         Patients undergoing fluorescein dye angiography may retain small amounts of fluorescein in the body for 48-72 hours post procedure  Samples containing fluorescein can produce falsely depressed TSH values  If the patient had this procedure,a specimen should be resubmitted post fluorescein clearance            The recommended reference ranges for TSH during pregnancy are as follows:  First trimester 0 1 to 2 5 uIU/mL  Second trimester  0 2 to 3 0 uIU/mL  Third trimester 0 3 to 3 0 uIU/m      UA w Reflex to Microscopic [[id number]]  (Abnormal) Collected:  08/31/18 1530    Lab Status:  Final result Specimen:  Urine from Urine, Clean Catch Updated:  08/31/18 1541     Color, UA Yellow     Clarity, UA Clear     Specific Gravity, UA 1 025     pH, UA 6 0     Leukocytes, UA Negative     Nitrite, UA Negative     Protein, UA Negative mg/dl      Glucose,  (1/4%) (A) mg/dl      Ketones, UA 40 (2+) (A) mg/dl      Urobilinogen, UA 0 2 E U /dl      Bilirubin, UA Negative     Blood, UA Negative    CBC and differential [[id number]]  (Abnormal) Collected:  08/31/18 1526    Lab Status:  Final result Specimen:  Blood from Arm, Left Updated:  08/31/18 1536     WBC 11 09 (H) Thousand/uL      RBC 4 53 Million/uL      Hemoglobin 13 0 g/dL      Hematocrit 40 6 %      MCV 90 fL      MCH 28 7 pg      MCHC 32 0 g/dL      RDW 12 9 %      MPV 10 8 fL      Platelets 181 Thousands/uL      nRBC 0 /100 WBCs      Neutrophils Relative 68 %      Immat GRANS % 0 %      Lymphocytes Relative 27 %      Monocytes Relative 4 %      Eosinophils Relative 1 %      Basophils Relative 0 %      Neutrophils Absolute 7 46 Thousands/µL      Immature Grans Absolute 0 03 Thousand/uL      Lymphocytes Absolute 3 00 Thousands/µL      Monocytes Absolute 0 49 Thousand/µL      Eosinophils Absolute 0 08 Thousand/µL      Basophils Absolute 0 03 Thousands/µL     POCT pregnancy, urine [[id number]]  (Normal) Resulted:  08/31/18 1535    Lab Status:  Final result Updated:  08/31/18 1535     EXT PREG TEST UR (Ref: Negative) negative    Fingerstick Glucose (POCT) [[id number]]  (Abnormal) Collected:  08/31/18 1511    Lab Status:  Final result Updated:  08/31/18 1512     POC Glucose 45 (L) mg/dl                  No orders to display              Procedures  ECG 12 Lead Documentation  Date/Time: 8/31/2018 3:06 PM  Performed by: Maia Singleton  Authorized by: Maia Singleton     Indications / Diagnosis:  Palpitations  ECG reviewed by me, the ED Provider: yes Patient location:  ED  Rate:     ECG rate:  94    ECG rate assessment: normal    Rhythm:     Rhythm: sinus rhythm    QRS:     QRS axis:  Normal    QRS intervals:  Normal  ST segments:     ST segments:  Normal  T waves:     T waves: normal    Other findings:     Other findings: prolonged qTc interval             Phone Contacts  ED Phone Contact    ED Course                               MDM  Number of Diagnoses or Management Options  Hypoglycemia: new and requires workup     Amount and/or Complexity of Data Reviewed  Clinical lab tests: ordered and reviewed  Independent visualization of images, tracings, or specimens: yes    Patient Progress  Patient progress: improved    CritCare Time    Disposition  Final diagnoses:   Hypoglycemia     Time reflects when diagnosis was documented in both MDM as applicable and the Disposition within this note     Time User Action Codes Description Comment    8/31/2018  5:13 PM Mamie Dunbar 89, [address]] Hypoglycemia       ED Disposition     ED Disposition Condition Comment    Discharge  Kristina Gross discharge to home/self care  Condition at discharge: Good        Follow-up Information     Follow up With Specialties Details Why Contact Info    Adriel Case, [address], Nurse Practitioner   [address] [place] [address]  [phone]            Patient's Medications    No medications on file     No discharge procedures on file      ED Provider  Electronically Signed by           Suzanna Pizano MD  08/31/18 4593

## 2019-04-02 ENCOUNTER — HOSPITAL ENCOUNTER (EMERGENCY)
Facility: HOSPITAL | Age: 24
Discharge: HOME | End: 2019-04-02
Attending: EMERGENCY MEDICINE | Admitting: EMERGENCY MEDICINE
Payer: COMMERCIAL

## 2019-04-02 VITALS
TEMPERATURE: 98.2 F | SYSTOLIC BLOOD PRESSURE: 127 MMHG | RESPIRATION RATE: 18 BRPM | BODY MASS INDEX: 19.42 KG/M2 | OXYGEN SATURATION: 100 % | HEIGHT: 64 IN | HEART RATE: 81 BPM | DIASTOLIC BLOOD PRESSURE: 81 MMHG | WEIGHT: 113.76 LBS

## 2019-04-02 DIAGNOSIS — R94.31 ABNORMAL EKG: ICD-10-CM

## 2019-04-02 DIAGNOSIS — R55 SYNCOPE: Primary | ICD-10-CM

## 2019-04-02 LAB
ANION GAP BLD CALC-SCNC: 16 MMOL/L (ref 4–13)
ATRIAL RATE: 83 BPM
BUN BLD-MCNC: 15 MG/DL (ref 5–25)
CA-I BLD-SCNC: 1.08 MMOL/L (ref 1.12–1.32)
CHLORIDE BLD-SCNC: 103 MMOL/L (ref 100–108)
CREAT BLD-MCNC: 0.8 MG/DL (ref 0.6–1.3)
GFR SERPL CREATININE-BSD FRML MDRD: 104 ML/MIN/1.73SQ M
GLUCOSE SERPL-MCNC: 72 MG/DL (ref 65–140)
HCT VFR BLD CALC: 36 % (ref 34.8–46.1)
HGB BLDA-MCNC: 12.2 G/DL (ref 11.5–15.4)
P AXIS: 54 DEGREES
PCO2 BLD: 24 MMOL/L (ref 21–32)
POTASSIUM BLD-SCNC: 3.6 MMOL/L (ref 3.5–5.3)
PR INTERVAL: 156 MS
QRS AXIS: 88 DEGREES
QRSD INTERVAL: 82 MS
QT INTERVAL: 384 MS
QTC INTERVAL: 451 MS
SODIUM BLD-SCNC: 138 MMOL/L (ref 136–145)
SPECIMEN SOURCE: ABNORMAL
T WAVE AXIS: 53 DEGREES
VENTRICULAR RATE: 83 BPM

## 2019-04-02 PROCEDURE — 93005 ELECTROCARDIOGRAM TRACING: CPT

## 2019-04-02 PROCEDURE — 99284 EMERGENCY DEPT VISIT MOD MDM: CPT

## 2019-04-02 PROCEDURE — 93010 ELECTROCARDIOGRAM REPORT: CPT | Performed by: INTERNAL MEDICINE

## 2019-04-02 PROCEDURE — 85014 HEMATOCRIT: CPT

## 2019-04-02 PROCEDURE — 80047 BASIC METABLC PNL IONIZED CA: CPT

## 2019-04-02 PROCEDURE — 99283 EMERGENCY DEPT VISIT LOW MDM: CPT | Performed by: EMERGENCY MEDICINE

## 2019-08-07 ENCOUNTER — HOSPITAL ENCOUNTER (EMERGENCY)
Facility: HOSPITAL | Age: 24
Discharge: HOME | End: 2019-08-07
Attending: EMERGENCY MEDICINE
Payer: COMMERCIAL

## 2019-08-07 VITALS
WEIGHT: 104.94 LBS | HEART RATE: 116 BPM | OXYGEN SATURATION: 100 % | TEMPERATURE: 99.2 F | DIASTOLIC BLOOD PRESSURE: 78 MMHG | RESPIRATION RATE: 19 BRPM | SYSTOLIC BLOOD PRESSURE: 112 MMHG | BODY MASS INDEX: 18.01 KG/M2

## 2019-08-07 DIAGNOSIS — R07.9 CHEST PAIN: ICD-10-CM

## 2019-08-07 DIAGNOSIS — R06.00 DYSPNEA: ICD-10-CM

## 2019-08-07 DIAGNOSIS — R05.9 COUGH: Primary | ICD-10-CM

## 2019-08-07 PROCEDURE — 99282 EMERGENCY DEPT VISIT SF MDM: CPT

## 2019-08-07 PROCEDURE — 99283 EMERGENCY DEPT VISIT LOW MDM: CPT | Performed by: EMERGENCY MEDICINE

## 2019-08-07 NOTE — ED PROVIDER NOTES
History  Chief Complaint   Patient presents with    Cold Like Symptoms     Pt states, "I feel like I can't get a deep breath " Pt states she also has pain with deep breaths, congestion, and intermittent chest pain with coughing  2 weeks of intermittent dyspnea and sore throat and dry nonproductive cough  Episodes last several minutes at a time  Sometimes she notes pleuritic substernal pain but this too is intermittent  She denies leg pain and swelling, h/o VTE, recent surgery, and hemoptysis  She denies fever  She denies syncope  She reports a h/o recurrent similar sxs in past although they previously were not as severe  None       Past Medical History:   Diagnosis Date    Asthma        History reviewed  No pertinent surgical history  History reviewed  No pertinent family history  I have reviewed and agree with the history as documented  Social History     Tobacco Use    Smoking status: Never Smoker    Smokeless tobacco: Never Used   Substance Use Topics    Alcohol use: Yes     Comment: occasional    Drug use: Yes     Types: Marijuana, Prescription     Comment: xanax        Review of Systems   Constitutional: Negative for chills and fever  Respiratory: Positive for cough, chest tightness and shortness of breath  Negative for apnea, choking, wheezing and stridor  Cardiovascular: Negative for palpitations and leg swelling  All other systems reviewed and are negative  Physical Exam  Physical Exam   Constitutional: She is oriented to person, place, and time  She appears well-developed and well-nourished  No distress  HENT:   Head: Normocephalic and atraumatic  Eyes: Pupils are equal, round, and reactive to light  Conjunctivae and EOM are normal    Neck: Normal range of motion  Neck supple  No JVD present  Cardiovascular: Normal rate, regular rhythm, normal heart sounds and intact distal pulses  Pulmonary/Chest: Effort normal and breath sounds normal  No stridor   No respiratory distress  She has no wheezes  She has no rales  She exhibits no tenderness  Abdominal: Soft  She exhibits no distension  There is no tenderness  There is no rebound and no guarding  Musculoskeletal: Normal range of motion  She exhibits no edema, tenderness or deformity  Neurological: She is alert and oriented to person, place, and time  No cranial nerve deficit or sensory deficit  She exhibits normal muscle tone  Coordination normal    Skin: Skin is warm and dry  Capillary refill takes less than 2 seconds  No rash noted  She is not diaphoretic  No erythema  No pallor  Nursing note and vitals reviewed  Vital Signs  ED Triage Vitals [08/07/19 1734]   Temperature Pulse Respirations Blood Pressure SpO2   99 2 °F (37 3 °C) (!) 116 19 112/78 100 %      Temp Source Heart Rate Source Patient Position - Orthostatic VS BP Location FiO2 (%)   Oral Monitor Sitting Left arm --      Pain Score       --           Vitals:    08/07/19 1734   BP: 112/78   Pulse: (!) 116   Patient Position - Orthostatic VS: Sitting         Visual Acuity      ED Medications  Medications - No data to display    Diagnostic Studies  Results Reviewed     None                 No orders to display              Procedures  Procedures       ED Course                               MDM  Number of Diagnoses or Management Options  Chest pain:   Cough:   Dyspnea:   Diagnosis management comments: Several weeks of intermittent concerns including dyspnea, cough, sore throat and chest pain  Tachycardic at triage but not during my exam  Normal wob  Speaking full sentences without limitation or difficulty  Lungs CTAB  Although she was tachycardic I have a very low suspicion for VTE and therefore feel that further testing at this time for that dx is not indicated         Disposition  Final diagnoses:   Cough   Dyspnea   Chest pain     Time reflects when diagnosis was documented in both MDM as applicable and the Disposition within this note     Time User Action Codes Description Comment    8/7/2019  6:33 PM Nick Duque Add [R05] Cough     8/7/2019  6:33 PM BarbaIker Add [R06 00] Dyspnea     8/7/2019  6:33 PM Nick Duque Add [R07 9] Chest pain       ED Disposition     ED Disposition Condition Date/Time Comment    Discharge Stable Wed Aug 7, 2019  6:33 PM Madhuri Chi discharge to home/self care  Follow-up Information     Follow up With Specialties Details Why Tatiana Perez MD Internal Medicine In 1 week  [address] [place] [address]  [phone]            Patient's Medications    No medications on file     No discharge procedures on file      ED Provider  Electronically Signed by           Compa Conroy MD  08/07/19 9513